# Patient Record
Sex: FEMALE | Race: WHITE | NOT HISPANIC OR LATINO | Employment: FULL TIME | URBAN - METROPOLITAN AREA
[De-identification: names, ages, dates, MRNs, and addresses within clinical notes are randomized per-mention and may not be internally consistent; named-entity substitution may affect disease eponyms.]

---

## 2021-01-27 ENCOUNTER — TELEMEDICINE (OUTPATIENT)
Dept: BEHAVIORAL HEALTH | Facility: CLINIC | Age: 66
End: 2021-01-27
Payer: COMMERCIAL

## 2021-01-27 DIAGNOSIS — F10.21 ALCOHOL DEPENDENCE IN REMISSION (HCC): ICD-10-CM

## 2021-01-27 DIAGNOSIS — F31.32 BIPOLAR AFFECTIVE DISORDER, CURRENTLY DEPRESSED, MODERATE (HCC): Primary | ICD-10-CM

## 2021-01-27 DIAGNOSIS — F41.1 GENERALIZED ANXIETY DISORDER: ICD-10-CM

## 2021-01-27 PROCEDURE — 96127 BRIEF EMOTIONAL/BEHAV ASSMT: CPT | Performed by: PSYCHIATRY & NEUROLOGY

## 2021-01-27 PROCEDURE — 99204 OFFICE O/P NEW MOD 45 MIN: CPT | Performed by: PSYCHIATRY & NEUROLOGY

## 2021-01-27 RX ORDER — TRAZODONE HYDROCHLORIDE 50 MG/1
50 TABLET ORAL NIGHTLY PRN
Qty: 60 TAB | Refills: 1 | Status: SHIPPED | OUTPATIENT
Start: 2021-01-27 | End: 2021-03-26 | Stop reason: SDUPTHER

## 2021-01-27 RX ORDER — LURASIDONE HYDROCHLORIDE 20 MG/1
20 TABLET, FILM COATED ORAL
Qty: 60 TAB | Refills: 1 | Status: SHIPPED | OUTPATIENT
Start: 2021-01-27 | End: 2021-03-26

## 2021-01-27 ASSESSMENT — ANXIETY QUESTIONNAIRES
5. BEING SO RESTLESS THAT IT IS HARD TO SIT STILL: NOT AT ALL
1. FEELING NERVOUS, ANXIOUS, OR ON EDGE: NEARLY EVERY DAY
2. NOT BEING ABLE TO STOP OR CONTROL WORRYING: NEARLY EVERY DAY
6. BECOMING EASILY ANNOYED OR IRRITABLE: SEVERAL DAYS
7. FEELING AFRAID AS IF SOMETHING AWFUL MIGHT HAPPEN: MORE THAN HALF THE DAYS
4. TROUBLE RELAXING: NEARLY EVERY DAY
GAD7 TOTAL SCORE: 15
3. WORRYING TOO MUCH ABOUT DIFFERENT THINGS: NEARLY EVERY DAY

## 2021-01-27 ASSESSMENT — PATIENT HEALTH QUESTIONNAIRE - PHQ9
CLINICAL INTERPRETATION OF PHQ2 SCORE: 4
5. POOR APPETITE OR OVEREATING: 3 - NEARLY EVERY DAY
SUM OF ALL RESPONSES TO PHQ QUESTIONS 1-9: 18
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
## 2021-03-25 ENCOUNTER — TELEPHONE (OUTPATIENT)
Dept: SCHEDULING | Facility: IMAGING CENTER | Age: 66
End: 2021-03-25

## 2021-03-26 ENCOUNTER — APPOINTMENT (OUTPATIENT)
Dept: BEHAVIORAL HEALTH | Facility: CLINIC | Age: 66
End: 2021-03-26
Payer: COMMERCIAL

## 2021-03-26 ENCOUNTER — TELEMEDICINE (OUTPATIENT)
Dept: BEHAVIORAL HEALTH | Facility: CLINIC | Age: 66
End: 2021-03-26

## 2021-03-26 DIAGNOSIS — F41.1 GENERALIZED ANXIETY DISORDER: ICD-10-CM

## 2021-03-26 DIAGNOSIS — F10.21 ALCOHOL DEPENDENCE IN REMISSION (HCC): ICD-10-CM

## 2021-03-26 DIAGNOSIS — F31.32 BIPOLAR AFFECTIVE DISORDER, CURRENTLY DEPRESSED, MODERATE (HCC): ICD-10-CM

## 2021-03-26 DIAGNOSIS — L65.9 HAIR LOSS: ICD-10-CM

## 2021-03-26 PROCEDURE — 99214 OFFICE O/P EST MOD 30 MIN: CPT | Mod: 95,CR | Performed by: PSYCHIATRY & NEUROLOGY

## 2021-03-26 PROCEDURE — 90833 PSYTX W PT W E/M 30 MIN: CPT | Mod: 95,CR | Performed by: PSYCHIATRY & NEUROLOGY

## 2021-03-26 RX ORDER — BIOTIN 5 MG
5 TABLET ORAL DAILY
Qty: 30 TABLET | Refills: 1 | Status: SHIPPED | OUTPATIENT
Start: 2021-03-26 | End: 2021-04-12

## 2021-03-26 RX ORDER — GINSENG 100 MG
50 CAPSULE ORAL DAILY
Qty: 30 TABLET | Refills: 1 | Status: SHIPPED | OUTPATIENT
Start: 2021-03-26 | End: 2021-04-12

## 2021-03-26 RX ORDER — LURASIDONE HYDROCHLORIDE 40 MG/1
40 TABLET, FILM COATED ORAL
Qty: 30 TABLET | Refills: 1 | Status: SHIPPED | OUTPATIENT
Start: 2021-03-26 | End: 2021-06-29 | Stop reason: SDUPTHER

## 2021-03-26 RX ORDER — TRAZODONE HYDROCHLORIDE 50 MG/1
50 TABLET ORAL NIGHTLY PRN
Qty: 60 TABLET | Refills: 1 | Status: SHIPPED | OUTPATIENT
Start: 2021-03-26 | End: 2022-03-01
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
## 2021-04-12 ENCOUNTER — OFFICE VISIT (OUTPATIENT)
Dept: MEDICAL GROUP | Facility: IMAGING CENTER | Age: 66
End: 2021-04-12
Payer: COMMERCIAL

## 2021-04-12 ENCOUNTER — HOSPITAL ENCOUNTER (OUTPATIENT)
Dept: LAB | Facility: MEDICAL CENTER | Age: 66
End: 2021-04-12
Attending: PHYSICIAN ASSISTANT
Payer: COMMERCIAL

## 2021-04-12 VITALS
RESPIRATION RATE: 16 BRPM | DIASTOLIC BLOOD PRESSURE: 78 MMHG | SYSTOLIC BLOOD PRESSURE: 138 MMHG | TEMPERATURE: 99.2 F | HEART RATE: 63 BPM | OXYGEN SATURATION: 99 % | WEIGHT: 228 LBS | HEIGHT: 64 IN | BODY MASS INDEX: 38.93 KG/M2

## 2021-04-12 DIAGNOSIS — F10.21 ALCOHOL DEPENDENCE IN REMISSION (HCC): ICD-10-CM

## 2021-04-12 DIAGNOSIS — R73.03 PREDIABETES: ICD-10-CM

## 2021-04-12 DIAGNOSIS — G47.33 OBSTRUCTIVE SLEEP APNEA SYNDROME: ICD-10-CM

## 2021-04-12 DIAGNOSIS — I48.91 ATRIAL FIBRILLATION, UNSPECIFIED TYPE (HCC): ICD-10-CM

## 2021-04-12 DIAGNOSIS — Z00.00 WELLNESS EXAMINATION: ICD-10-CM

## 2021-04-12 DIAGNOSIS — I10 HYPERTENSION, UNSPECIFIED TYPE: ICD-10-CM

## 2021-04-12 DIAGNOSIS — F31.32 BIPOLAR AFFECTIVE DISORDER, CURRENTLY DEPRESSED, MODERATE (HCC): ICD-10-CM

## 2021-04-12 DIAGNOSIS — L28.1 PRURIGO NODULARIS: ICD-10-CM

## 2021-04-12 DIAGNOSIS — Z11.59 NEED FOR HEPATITIS C SCREENING TEST: ICD-10-CM

## 2021-04-12 DIAGNOSIS — E04.1 NONTOXIC SINGLE THYROID NODULE: ICD-10-CM

## 2021-04-12 DIAGNOSIS — K63.5 BENIGN COLON POLYP: ICD-10-CM

## 2021-04-12 DIAGNOSIS — Z78.0 POST-MENOPAUSE: ICD-10-CM

## 2021-04-12 DIAGNOSIS — T78.40XA ALLERGY, INITIAL ENCOUNTER: ICD-10-CM

## 2021-04-12 DIAGNOSIS — Z12.31 ENCOUNTER FOR SCREENING MAMMOGRAM FOR BREAST CANCER: ICD-10-CM

## 2021-04-12 DIAGNOSIS — E78.5 HYPERLIPIDEMIA, UNSPECIFIED HYPERLIPIDEMIA TYPE: ICD-10-CM

## 2021-04-12 PROBLEM — E74.39 GLUCOSE INTOLERANCE: Status: ACTIVE | Noted: 2019-02-21

## 2021-04-12 PROBLEM — F31.9 BIPOLAR 1 DISORDER (HCC): Status: ACTIVE | Noted: 2019-02-21

## 2021-04-12 PROBLEM — K21.9 GASTROESOPHAGEAL REFLUX DISEASE WITHOUT ESOPHAGITIS: Status: ACTIVE | Noted: 2019-02-21

## 2021-04-12 PROBLEM — M79.7 FIBROMYALGIA: Status: ACTIVE | Noted: 2020-05-13

## 2021-04-12 PROBLEM — L30.9 ECZEMA: Status: ACTIVE | Noted: 2021-04-12

## 2021-04-12 PROBLEM — K44.9 HIATAL HERNIA: Status: ACTIVE | Noted: 2020-11-23

## 2021-04-12 LAB
BASOPHILS # BLD AUTO: 0.5 % (ref 0–1.8)
BASOPHILS # BLD: 0.04 K/UL (ref 0–0.12)
EOSINOPHIL # BLD AUTO: 0.2 K/UL (ref 0–0.51)
EOSINOPHIL NFR BLD: 2.7 % (ref 0–6.9)
ERYTHROCYTE [DISTWIDTH] IN BLOOD BY AUTOMATED COUNT: 41.7 FL (ref 35.9–50)
ERYTHROCYTE [SEDIMENTATION RATE] IN BLOOD BY WESTERGREN METHOD: 36 MM/HOUR (ref 0–30)
EST. AVERAGE GLUCOSE BLD GHB EST-MCNC: 128 MG/DL
FOLATE SERPL-MCNC: 6.7 NG/ML
HBA1C MFR BLD: 6.1 % (ref 4–5.6)
HCT VFR BLD AUTO: 39.3 % (ref 37–47)
HCV AB SER QL: NORMAL
HGB BLD-MCNC: 12.9 G/DL (ref 12–16)
IMM GRANULOCYTES # BLD AUTO: 0.03 K/UL (ref 0–0.11)
IMM GRANULOCYTES NFR BLD AUTO: 0.4 % (ref 0–0.9)
LYMPHOCYTES # BLD AUTO: 1.35 K/UL (ref 1–4.8)
LYMPHOCYTES NFR BLD: 18.2 % (ref 22–41)
MCH RBC QN AUTO: 29.4 PG (ref 27–33)
MCHC RBC AUTO-ENTMCNC: 32.8 G/DL (ref 33.6–35)
MCV RBC AUTO: 89.5 FL (ref 81.4–97.8)
MONOCYTES # BLD AUTO: 0.44 K/UL (ref 0–0.85)
MONOCYTES NFR BLD AUTO: 5.9 % (ref 0–13.4)
NEUTROPHILS # BLD AUTO: 5.36 K/UL (ref 2–7.15)
NEUTROPHILS NFR BLD: 72.3 % (ref 44–72)
NRBC # BLD AUTO: 0 K/UL
NRBC BLD-RTO: 0 /100 WBC
PLATELET # BLD AUTO: 272 K/UL (ref 164–446)
PMV BLD AUTO: 10 FL (ref 9–12.9)
RBC # BLD AUTO: 4.39 M/UL (ref 4.2–5.4)
T4 FREE SERPL-MCNC: 1.06 NG/DL (ref 0.93–1.7)
TSH SERPL DL<=0.005 MIU/L-ACNC: 1.32 UIU/ML (ref 0.38–5.33)
VIT B12 SERPL-MCNC: 448 PG/ML (ref 211–911)
WBC # BLD AUTO: 7.4 K/UL (ref 4.8–10.8)

## 2021-04-12 PROCEDURE — 36415 COLL VENOUS BLD VENIPUNCTURE: CPT

## 2021-04-12 PROCEDURE — 83036 HEMOGLOBIN GLYCOSYLATED A1C: CPT

## 2021-04-12 PROCEDURE — 99386 PREV VISIT NEW AGE 40-64: CPT | Performed by: PHYSICIAN ASSISTANT

## 2021-04-12 PROCEDURE — 85652 RBC SED RATE AUTOMATED: CPT

## 2021-04-12 PROCEDURE — 85025 COMPLETE CBC W/AUTO DIFF WBC: CPT

## 2021-04-12 PROCEDURE — 82746 ASSAY OF FOLIC ACID SERUM: CPT

## 2021-04-12 PROCEDURE — 84443 ASSAY THYROID STIM HORMONE: CPT

## 2021-04-12 PROCEDURE — 84439 ASSAY OF FREE THYROXINE: CPT

## 2021-04-12 PROCEDURE — G0472 HEP C SCREEN HIGH RISK/OTHER: HCPCS

## 2021-04-12 PROCEDURE — 82607 VITAMIN B-12: CPT

## 2021-04-12 RX ORDER — QUETIAPINE FUMARATE 50 MG/1
50 TABLET, FILM COATED ORAL
COMMUNITY
End: 2021-04-12

## 2021-04-12 RX ORDER — TRIAMCINOLONE ACETONIDE 5 MG/G
CREAM TOPICAL
COMMUNITY
End: 2021-06-29

## 2021-04-12 RX ORDER — DEXAMETHASONE 4 MG/1
1 TABLET ORAL
COMMUNITY
Start: 2021-01-14 | End: 2021-05-12

## 2021-04-12 RX ORDER — PANTOPRAZOLE SODIUM 20 MG/1
20 TABLET, DELAYED RELEASE ORAL DAILY
COMMUNITY
Start: 2021-01-14 | End: 2021-06-29 | Stop reason: SDUPTHER

## 2021-04-12 RX ORDER — METOPROLOL SUCCINATE 25 MG/1
25 TABLET, EXTENDED RELEASE ORAL DAILY
COMMUNITY
Start: 2021-01-14 | End: 2021-12-08

## 2021-04-12 RX ORDER — COLESEVELAM 180 1/1
625 TABLET ORAL 2 TIMES DAILY WITH MEALS
COMMUNITY
Start: 2021-03-22 | End: 2021-10-15

## 2021-04-12 ASSESSMENT — ANXIETY QUESTIONNAIRES
5. BEING SO RESTLESS THAT IT IS HARD TO SIT STILL: SEVERAL DAYS
7. FEELING AFRAID AS IF SOMETHING AWFUL MIGHT HAPPEN: NEARLY EVERY DAY
6. BECOMING EASILY ANNOYED OR IRRITABLE: SEVERAL DAYS
4. TROUBLE RELAXING: NEARLY EVERY DAY
2. NOT BEING ABLE TO STOP OR CONTROL WORRYING: NEARLY EVERY DAY
3. WORRYING TOO MUCH ABOUT DIFFERENT THINGS: NEARLY EVERY DAY
1. FEELING NERVOUS, ANXIOUS, OR ON EDGE: NEARLY EVERY DAY
GAD7 TOTAL SCORE: 17

## 2021-04-12 ASSESSMENT — PATIENT HEALTH QUESTIONNAIRE - PHQ9
CLINICAL INTERPRETATION OF PHQ2 SCORE: 5
SUM OF ALL RESPONSES TO PHQ QUESTIONS 1-9: 19
5. POOR APPETITE OR OVEREATING: 1 - SEVERAL DAYS

## 2021-04-12 ASSESSMENT — PAIN SCALES - GENERAL: PAINLEVEL: NO PAIN

## 2021-04-12 NOTE — ASSESSMENT & PLAN NOTE
Patient had colonoscopy in December 2020 with 3 polyps.  2 of the polyps were adenomas one was around 1 cm in size and the other was hyperplastic.  Was recommended to repeat in 2022.

## 2021-04-12 NOTE — PATIENT INSTRUCTIONS
Once resulted, your lab/test/imaging results will show up automatically in your MyChart. Please wait for my interpretation and recommendations prior to viewing your results to avoid any unnecessary confusion or misinterpretation. I will address all of the lab values that I interpret as abnormal and message you accordingly on your MyChart. I will always send you a message on your labs even if they are normal. If you do not hear back from me within 5 business days after getting your labs drawn, then please send me a message on MyChart so I can obtain your labs (especially if you went to an outside lab - [facility], [facility], etc). If you have any additional questions or concerns beyond my interpretation of your results, please make an appointment with me to discuss in further detail.    Please only use the [employer] messaging system for questions regarding your most recent appointment or if advised to use otherwise (glucose or blood pressure reporting). If you have any new problems or concerns, you must make an appointment to discuss. This includes any referral requests.     Thank you,    Alejandra Frederick PA-C ([place])  Physician Assistant Certified  [facility]

## 2021-04-15 ENCOUNTER — HOSPITAL ENCOUNTER (OUTPATIENT)
Dept: LAB | Facility: MEDICAL CENTER | Age: 66
End: 2021-04-15
Attending: PHYSICIAN ASSISTANT
Payer: COMMERCIAL

## 2021-04-15 DIAGNOSIS — I10 HYPERTENSION, UNSPECIFIED TYPE: ICD-10-CM

## 2021-04-15 DIAGNOSIS — E78.5 HYPERLIPIDEMIA, UNSPECIFIED HYPERLIPIDEMIA TYPE: ICD-10-CM

## 2021-04-15 LAB
ALBUMIN SERPL BCP-MCNC: 3.9 G/DL (ref 3.2–4.9)
ALBUMIN/GLOB SERPL: 1.4 G/DL
ALP SERPL-CCNC: 129 U/L (ref 30–99)
ALT SERPL-CCNC: 16 U/L (ref 2–50)
ANION GAP SERPL CALC-SCNC: 7 MMOL/L (ref 7–16)
AST SERPL-CCNC: 18 U/L (ref 12–45)
BILIRUB SERPL-MCNC: 0.4 MG/DL (ref 0.1–1.5)
BUN SERPL-MCNC: 17 MG/DL (ref 8–22)
CALCIUM SERPL-MCNC: 9.1 MG/DL (ref 8.5–10.5)
CHLORIDE SERPL-SCNC: 106 MMOL/L (ref 96–112)
CHOLEST SERPL-MCNC: 186 MG/DL (ref 100–199)
CO2 SERPL-SCNC: 26 MMOL/L (ref 20–33)
CREAT SERPL-MCNC: 0.82 MG/DL (ref 0.5–1.4)
FASTING STATUS PATIENT QL REPORTED: NORMAL
GLOBULIN SER CALC-MCNC: 2.7 G/DL (ref 1.9–3.5)
GLUCOSE SERPL-MCNC: 115 MG/DL (ref 65–99)
HDLC SERPL-MCNC: 50 MG/DL
LDLC SERPL CALC-MCNC: 99 MG/DL
POTASSIUM SERPL-SCNC: 4.5 MMOL/L (ref 3.6–5.5)
PROT SERPL-MCNC: 6.6 G/DL (ref 6–8.2)
SODIUM SERPL-SCNC: 139 MMOL/L (ref 135–145)
TRIGL SERPL-MCNC: 186 MG/DL (ref 0–149)

## 2021-04-15 PROCEDURE — 80053 COMPREHEN METABOLIC PANEL: CPT

## 2021-04-15 PROCEDURE — 80061 LIPID PANEL: CPT

## 2021-04-15 PROCEDURE — 36415 COLL VENOUS BLD VENIPUNCTURE: CPT

## 2021-04-16 PROBLEM — R74.8 ELEVATED ALKALINE PHOSPHATASE LEVEL: Status: ACTIVE | Noted: 2021-04-16

## 2021-05-06 ENCOUNTER — APPOINTMENT (OUTPATIENT)
Dept: RADIOLOGY | Facility: MEDICAL CENTER | Age: 66
End: 2021-05-06
Attending: PHYSICIAN ASSISTANT
Payer: COMMERCIAL

## 2021-05-12 ENCOUNTER — OFFICE VISIT (OUTPATIENT)
Dept: CARDIOLOGY | Facility: MEDICAL CENTER | Age: 66
End: 2021-05-12
Attending: PHYSICIAN ASSISTANT
Payer: MEDICARE

## 2021-05-12 VITALS
OXYGEN SATURATION: 96 % | WEIGHT: 228 LBS | HEART RATE: 68 BPM | DIASTOLIC BLOOD PRESSURE: 78 MMHG | BODY MASS INDEX: 38.93 KG/M2 | SYSTOLIC BLOOD PRESSURE: 124 MMHG | HEIGHT: 64 IN

## 2021-05-12 DIAGNOSIS — I70.0 ATHEROSCLEROSIS OF AORTA (HCC): ICD-10-CM

## 2021-05-12 DIAGNOSIS — I10 ESSENTIAL HYPERTENSION: ICD-10-CM

## 2021-05-12 DIAGNOSIS — I48.92 ATRIAL FLUTTER, UNSPECIFIED TYPE (HCC): ICD-10-CM

## 2021-05-12 DIAGNOSIS — I51.9 HEART DISEASE, UNSPECIFIED: ICD-10-CM

## 2021-05-12 DIAGNOSIS — Z91.89 10 YEAR RISK OF MI OR STROKE 7.5% OR GREATER: ICD-10-CM

## 2021-05-12 DIAGNOSIS — E78.2 MIXED HYPERLIPIDEMIA: ICD-10-CM

## 2021-05-12 LAB — EKG IMPRESSION: NORMAL

## 2021-05-12 PROCEDURE — 93000 ELECTROCARDIOGRAM COMPLETE: CPT | Performed by: INTERNAL MEDICINE

## 2021-05-12 PROCEDURE — 99204 OFFICE O/P NEW MOD 45 MIN: CPT | Performed by: INTERNAL MEDICINE

## 2021-05-12 ASSESSMENT — ENCOUNTER SYMPTOMS
SYNCOPE: 0
BLURRED VISION: 0
DIARRHEA: 0
COUGH: 0
PND: 0
ALTERED MENTAL STATUS: 0
ORTHOPNEA: 0
HEARTBURN: 0
PALPITATIONS: 0
FEVER: 0
WEIGHT GAIN: 0
BACK PAIN: 0
DECREASED APPETITE: 0
VOMITING: 0
SHORTNESS OF BREATH: 0
DYSPNEA ON EXERTION: 0
DIZZINESS: 0
IRREGULAR HEARTBEAT: 0
NEAR-SYNCOPE: 0
WEIGHT LOSS: 0
FLANK PAIN: 0
CLAUDICATION: 0
ABDOMINAL PAIN: 0
CONSTIPATION: 0
NAUSEA: 0
DEPRESSION: 0

## 2021-05-12 ASSESSMENT — FIBROSIS 4 INDEX: FIB4 SCORE: 1.08

## 2021-05-12 NOTE — PROGRESS NOTES
Cardiology Note    Chief Complaint   Patient presents with   • Atrial Fibrillation     S/p ablation NOV.2020   • Atrial Flutter   • Hypertension       History of Present Illness: Akua Winter is a 66 y.o. female PMH atrial flutter s/p DCCV, HLD, HTN, GERD, DAMIÁN on cpap, aortic atherosclerosis who presents for initial visit.    Walks regularly. No cardiac complaints when doing so. Although admits not as far as in the [place] area. No toxic social habits currently. She is recovered alcoholic; sober 9 years. Active member of AA and feels privileged to help others overcome alcoholism. Bother parents passed from cva and mi in early 60s.     Review of Systems   Constitutional: Negative for decreased appetite, fever, malaise/fatigue, weight gain and weight loss.   HENT: Negative for congestion and nosebleeds.    Eyes: Negative for blurred vision.   Cardiovascular: Negative for chest pain, claudication, dyspnea on exertion, irregular heartbeat, leg swelling, near-syncope, orthopnea, palpitations, paroxysmal nocturnal dyspnea and syncope.   Respiratory: Negative for cough and shortness of breath.    Endocrine: Negative for cold intolerance and heat intolerance.   Skin: Negative for rash.   Musculoskeletal: Negative for back pain.   Gastrointestinal: Negative for abdominal pain, constipation, diarrhea, heartburn, melena, nausea and vomiting.   Genitourinary: Negative for dysuria, flank pain and hematuria.   Neurological: Negative for dizziness.   Psychiatric/Behavioral: Negative for altered mental status and depression.         Past Medical History:   Diagnosis Date   • Apnea    • Atherosclerosis of aorta (HCC)    • Atrial fibrillation and flutter (HCC)    • Bipolar 1 disorder (HCC)    • GERD (gastroesophageal reflux disease)    • Hx of colonic polyp 8/5/2013    Formatting of this note might be different from the original. Had colonoscopy in 2013; 3 polyps removed in the colon [place] 2015, recommend repeat in 2020   • Pain      myofacial pain syndrome   • Prurigo nodularis    • Reactive airway disease          History reviewed. No pertinent surgical history.      Current Outpatient Medications   Medication Sig Dispense Refill   • apixaban (ELIQUIS) 5mg Tab Take 5 mg by mouth 2 times a day.     • colesevelam (WELCHOL) 625 MG Tab Take 625 mg by mouth 2 times a day with meals.     • metoprolol SR (TOPROL XL) 25 MG TABLET SR 24 HR Take 25 mg by mouth every day.     • pantoprazole (PROTONIX) 20 MG tablet Take 20 mg by mouth every day.     • triamcinolone acetonide (KENALOG) 0.5 % Cream Apply  topically.     • dupilumab (DUPIXENT) 300 MG/2ML Solution Prefilled Syringe injection Inject 2 mL under the skin every 14 days. 4 mL 3   • lurasidone (LATUDA) 40 MG Tab Take 1 tablet by mouth with dinner. 30 tablet 1   • traZODone (DESYREL) 50 MG Tab Take 1 tablet by mouth at bedtime as needed for Sleep (as needed for sleep). Take 1/2 to 1 tablet by mouth at bedtime. 60 tablet 1     No current facility-administered medications for this visit.         No Known Allergies      Family History   Problem Relation Age of Onset   • Stroke Mother    • Heart Disease Father          Social History     Socioeconomic History   • Marital status:      Spouse name: Not on file   • Number of children: Not on file   • Years of education: Not on file   • Highest education level: Not on file   Occupational History   • Not on file   Tobacco Use   • Smoking status: Former Smoker   • Smokeless tobacco: Never Used   • Tobacco comment: 40 years ago   Vaping Use   • Vaping Use: Never used   Substance and Sexual Activity   • Alcohol use: Not Currently     Comment: recovering alcoholic, sober for 8 years   • Drug use: Not Currently   • Sexual activity: Not Currently   Other Topics Concern   • Not on file   Social History Narrative   • Not on file     Social Determinants of Health     Financial Resource Strain:    • Difficulty of Paying Living Expenses:    Food Insecurity:   "  • Worried About Running Out of Food in the Last Year:    • Ran Out of Food in the Last Year:    Transportation Needs:    • Lack of Transportation (Medical):    • Lack of Transportation (Non-Medical):    Physical Activity:    • Days of Exercise per Week:    • Minutes of Exercise per Session:    Stress:    • Feeling of Stress :    Social Connections:    • Frequency of Communication with Friends and Family:    • Frequency of Social Gatherings with Friends and Family:    • Attends Confucianist Services:    • Active Member of Clubs or Organizations:    • Attends Club or Organization Meetings:    • Marital Status:    Intimate Partner Violence:    • Fear of Current or Ex-Partner:    • Emotionally Abused:    • Physically Abused:    • Sexually Abused:          Physical Exam:  Ambulatory Vitals  /78 (BP Location: Left arm, Patient Position: Sitting, BP Cuff Size: Adult)   Pulse 68   Ht 1.626 m (5' 4\")   Wt 103 kg (228 lb)   SpO2 96%    BP Readings from Last 4 Encounters:   05/12/21 124/78   04/12/21 138/78     Weight/BMI:   Vitals:    05/12/21 1429   BP: 124/78   Weight: 103 kg (228 lb)   Height: 1.626 m (5' 4\")    Body mass index is 39.14 kg/m².  Wt Readings from Last 4 Encounters:   05/12/21 103 kg (228 lb)   04/12/21 103 kg (228 lb)       Physical Exam  Constitutional:       General: She is not in acute distress.  HENT:      Head: Normocephalic and atraumatic.   Eyes:      Conjunctiva/sclera: Conjunctivae normal.      Pupils: Pupils are equal, round, and reactive to light.   Neck:      Vascular: No JVD.   Cardiovascular:      Rate and Rhythm: Normal rate and regular rhythm.      Heart sounds: Normal heart sounds. No murmur heard.   No friction rub. No gallop.    Pulmonary:      Effort: Pulmonary effort is normal. No respiratory distress.      Breath sounds: Normal breath sounds. No wheezing or rales.   Chest:      Chest wall: No tenderness.   Abdominal:      General: Bowel sounds are normal. There is no " distension.      Palpations: Abdomen is soft.   Musculoskeletal:      Cervical back: Normal range of motion and neck supple.   Skin:     General: Skin is warm and dry.   Neurological:      Mental Status: She is alert and oriented to person, place, and time.   Psychiatric:         Mood and Affect: Affect normal.         Judgment: Judgment normal.         Lab Data Review:  Lab Results   Component Value Date/Time    CHOLSTRLTOT 186 04/15/2021 07:33 AM    LDL 99 04/15/2021 07:33 AM    HDL 50 04/15/2021 07:33 AM    TRIGLYCERIDE 186 (H) 04/15/2021 07:33 AM       Lab Results   Component Value Date/Time    SODIUM 139 04/15/2021 07:33 AM    POTASSIUM 4.5 04/15/2021 07:33 AM    CHLORIDE 106 04/15/2021 07:33 AM    CO2 26 04/15/2021 07:33 AM    GLUCOSE 115 (H) 04/15/2021 07:33 AM    BUN 17 04/15/2021 07:33 AM    CREATININE 0.82 04/15/2021 07:33 AM     CrCl cannot be calculated (Patient's most recent lab result is older than the maximum 7 days allowed.).  Lab Results   Component Value Date/Time    ALKPHOSPHAT 129 (H) 04/15/2021 07:33 AM    ASTSGOT 18 04/15/2021 07:33 AM    ALTSGPT 16 04/15/2021 07:33 AM    TBILIRUBIN 0.4 04/15/2021 07:33 AM      Lab Results   Component Value Date/Time    WBC 7.4 04/12/2021 02:40 PM     Lab Results   Component Value Date/Time    HBA1C 6.1 (H) 04/12/2021 02:40 PM     No components found for: TROP      Cardiac Imaging and Procedures Review:      EKG 8/2020 outside EKG showing atrial flutter  EKG 5/12/21 reviewed by me sinus, NIVCD    TTE 10/2020  Conclusions   Summary   Normal LV size and function with preserved EF 60-65%.   Normal diastolic function.   No significant valvular disease.   Normal RA and PA pressures.     Medical Decision Making:  Problem List Items Addressed This Visit     Atherosclerosis of aorta (HCC)    Mixed hyperlipidemia    Hypertension    Relevant Orders    EKG (Completed)    Atrial flutter (HCC)    Relevant Orders    REFERRAL TO CARDIOLOGY    10 year risk of MI or stroke 7.5%  or greater    Relevant Orders    CT-CARDIAC SCORING    LIPOPROTEIN A    APOLIPOPROTEIN B    CRP HIGH SENSITIVE (CARDIAC)    Heart disease, unspecified     Relevant Orders    APOLIPOPROTEIN B    CRP HIGH SENSITIVE (CARDIAC)        Hx atrial flutter - prior documentations of fibrillation however she brought EKG from fateful ED visit showing flutter. Refer to EP for ablation. Consider dc doac thereafter.     HLD / aortic athero / interm 10 year risk ascvd - unable to pull up aortic pictures to discern significance/degree athero. Check CAC CT to eval statin candidacy    HTN - controlled. Goal 120/80.     It was my pleasure to meet with Ms. Winter.

## 2021-05-31 ENCOUNTER — HOSPITAL ENCOUNTER (OUTPATIENT)
Facility: MEDICAL CENTER | Age: 66
End: 2021-05-31
Attending: PHYSICIAN ASSISTANT
Payer: MEDICARE

## 2021-05-31 PROCEDURE — U0005 INFEC AGEN DETEC AMPLI PROBE: HCPCS

## 2021-05-31 PROCEDURE — U0003 INFECTIOUS AGENT DETECTION BY NUCLEIC ACID (DNA OR RNA); SEVERE ACUTE RESPIRATORY SYNDROME CORONAVIRUS 2 (SARS-COV-2) (CORONAVIRUS DISEASE [COVID-19]), AMPLIFIED PROBE TECHNIQUE, MAKING USE OF HIGH THROUGHPUT TECHNOLOGIES AS DESCRIBED BY CMS-2020-01-R: HCPCS

## 2021-06-01 LAB
COVID ORDER STATUS COVID19: NORMAL
SARS-COV-2 RNA RESP QL NAA+PROBE: NOTDETECTED
SPECIMEN SOURCE: NORMAL

## 2021-06-29 ENCOUNTER — OFFICE VISIT (OUTPATIENT)
Dept: MEDICAL GROUP | Facility: IMAGING CENTER | Age: 66
End: 2021-06-29
Payer: MEDICARE

## 2021-06-29 VITALS
BODY MASS INDEX: 38.07 KG/M2 | DIASTOLIC BLOOD PRESSURE: 68 MMHG | HEART RATE: 64 BPM | HEIGHT: 64 IN | SYSTOLIC BLOOD PRESSURE: 124 MMHG | TEMPERATURE: 98.5 F | WEIGHT: 223 LBS | OXYGEN SATURATION: 98 % | RESPIRATION RATE: 18 BRPM

## 2021-06-29 DIAGNOSIS — M79.89 LEG SWELLING: ICD-10-CM

## 2021-06-29 DIAGNOSIS — E78.5 HYPERLIPIDEMIA, UNSPECIFIED HYPERLIPIDEMIA TYPE: ICD-10-CM

## 2021-06-29 DIAGNOSIS — R73.03 PREDIABETES: ICD-10-CM

## 2021-06-29 DIAGNOSIS — R10.2 PELVIC PAIN: ICD-10-CM

## 2021-06-29 DIAGNOSIS — R74.8 ELEVATED ALKALINE PHOSPHATASE LEVEL: ICD-10-CM

## 2021-06-29 DIAGNOSIS — F31.32 BIPOLAR AFFECTIVE DISORDER, CURRENTLY DEPRESSED, MODERATE (HCC): ICD-10-CM

## 2021-06-29 DIAGNOSIS — F41.1 GENERALIZED ANXIETY DISORDER: ICD-10-CM

## 2021-06-29 DIAGNOSIS — I10 HYPERTENSION, UNSPECIFIED TYPE: ICD-10-CM

## 2021-06-29 DIAGNOSIS — R10.13 EPIGASTRIC PAIN: ICD-10-CM

## 2021-06-29 DIAGNOSIS — R14.0 ABDOMINAL BLOATING: ICD-10-CM

## 2021-06-29 DIAGNOSIS — K21.9 GASTROESOPHAGEAL REFLUX DISEASE WITHOUT ESOPHAGITIS: ICD-10-CM

## 2021-06-29 DIAGNOSIS — E66.9 OBESITY (BMI 30-39.9): ICD-10-CM

## 2021-06-29 DIAGNOSIS — L28.1 PRURIGO NODULARIS: ICD-10-CM

## 2021-06-29 DIAGNOSIS — Z91.89 10 YEAR RISK OF MI OR STROKE 7.5% OR GREATER: ICD-10-CM

## 2021-06-29 DIAGNOSIS — R11.0 NAUSEA: ICD-10-CM

## 2021-06-29 DIAGNOSIS — R19.7 DIARRHEA, UNSPECIFIED TYPE: ICD-10-CM

## 2021-06-29 PROBLEM — F31.70 BIPOLAR AFFECTIVE DISORDER IN REMISSION (HCC): Status: ACTIVE | Noted: 2021-01-27

## 2021-06-29 PROCEDURE — 99214 OFFICE O/P EST MOD 30 MIN: CPT | Performed by: PHYSICIAN ASSISTANT

## 2021-06-29 RX ORDER — ATORVASTATIN CALCIUM 20 MG/1
20 TABLET, FILM COATED ORAL DAILY
Qty: 90 TABLET | Refills: 1 | Status: SHIPPED | OUTPATIENT
Start: 2021-06-29 | End: 2021-11-01

## 2021-06-29 RX ORDER — LURASIDONE HYDROCHLORIDE 40 MG/1
40 TABLET, FILM COATED ORAL
Qty: 90 TABLET | Refills: 0 | Status: SHIPPED | OUTPATIENT
Start: 2021-06-29 | End: 2021-10-05

## 2021-06-29 RX ORDER — PANTOPRAZOLE SODIUM 20 MG/1
20 TABLET, DELAYED RELEASE ORAL 2 TIMES DAILY
Qty: 180 TABLET | Refills: 1 | Status: SHIPPED | OUTPATIENT
Start: 2021-06-29 | End: 2021-09-27

## 2021-06-29 ASSESSMENT — FIBROSIS 4 INDEX: FIB4 SCORE: 1.08

## 2021-06-29 ASSESSMENT — PAIN SCALES - GENERAL: PAINLEVEL: NO PAIN

## 2021-06-29 NOTE — ASSESSMENT & PLAN NOTE
Admits to leg swelling in both legs L>R over the past few months. She gets occasional pain into her calf. Takes eliquis daily. Has been increasing her walking to 2 hours a day over the past few months.

## 2021-06-29 NOTE — PROGRESS NOTES
Subjective:     CC:   Chief Complaint   Patient presents with   • Follow-Up   • Lab Results   • GI Problem   • Leg Swelling     leg ankle swelling and pain intermittant cant sleep/left leg       HPI:   Akua presents today for:    Abdominal bloating  Pt admits to intermittent abdominal bloating with epigastric/LUQ pain, morning nausea, vomiting once a week, and diarrhea every few weeks. She takes Protonix 20 mg once a day. Has breakthrough heart burn. Has occasional pelvic fullness/discomfort. No vaginal bleeding. No melena, hematochezia, hematemesis. Last endoscopy was in 2020 and was told to repeat in 2 years due to colon polyps.     Bipolar affective disorder in remission (HCC)  Controlled on Latuda - requesting refill today.    Hypertension  Chronic, controlled on metoprolol.    Prurigo nodularis  Controlled on dupixent. Requesting refill.    Leg swelling  Admits to leg swelling in both legs L>R over the past few months. She gets occasional pain into her calf. Takes eliquis daily. Has been increasing her walking to 2 hours a day over the past few months.      Past Medical History:   Diagnosis Date   • Apnea    • Atherosclerosis of aorta (HCC)    • Atrial fibrillation and flutter (HCC)    • Bipolar 1 disorder (HCC)    • GERD (gastroesophageal reflux disease)    • Hx of colonic polyp 8/5/2013    Formatting of this note might be different from the original. Had colonoscopy in 2013; 3 polyps removed in the colon [place] 2015, recommend repeat in 2020   • Pain     myofacial pain syndrome   • Prurigo nodularis    • Reactive airway disease      Social History     Tobacco Use   • Smoking status: Former Smoker   • Smokeless tobacco: Never Used   • Tobacco comment: 40 years ago   Vaping Use   • Vaping Use: Never used   Substance Use Topics   • Alcohol use: Not Currently     Comment: recovering alcoholic, sober for 8 years   • Drug use: Not Currently     Current Outpatient Medications Ordered in Epic   Medication Sig  "Dispense Refill   • pantoprazole (PROTONIX) 20 MG tablet Take 1 tablet by mouth 2 times a day for 90 days. 180 tablet 1   • dupilumab (DUPIXENT) 300 MG/2ML Solution Prefilled Syringe injection Inject 2 mL under the skin every 14 days. 4 mL 3   • lurasidone (LATUDA) 40 MG Tab Take 1 tablet by mouth with dinner. 90 tablet 0   • atorvastatin (LIPITOR) 20 MG Tab Take 1 tablet by mouth every day. 90 tablet 1   • apixaban (ELIQUIS) 5mg Tab Take 5 mg by mouth 2 times a day.     • colesevelam (WELCHOL) 625 MG Tab Take 625 mg by mouth 2 times a day with meals.     • metoprolol SR (TOPROL XL) 25 MG TABLET SR 24 HR Take 25 mg by mouth every day.     • traZODone (DESYREL) 50 MG Tab Take 1 tablet by mouth at bedtime as needed for Sleep (as needed for sleep). Take 1/2 to 1 tablet by mouth at bedtime. 60 tablet 1     No current Epic-ordered facility-administered medications on file.     Patient has no known allergies.    Health Maintenance: Completed    ROS:  Gen: no fevers/chills, no changes in weight  Eyes: no changes in vision  Pulm: no sob, no cough  CV: no chest pain, no palpitations, no edema  GI: + nausea/vomiting, + diarrhea  Skin: no rash, no lesions  Neuro: no headaches, no numbness/tingling  Heme/Lymph: no easy bruising or bleeding    Objective:   Exam:  /68 (BP Location: Left arm, Patient Position: Sitting, BP Cuff Size: Adult)   Pulse 64   Temp 36.9 °C (98.5 °F) (Temporal)   Resp 18   Ht 1.626 m (5' 4\")   Wt 101 kg (223 lb)   LMP  (LMP Unknown)   SpO2 98%   BMI 38.28 kg/m²    Body mass index is 38.28 kg/m².    Gen: Alert and oriented, No apparent distress.  HEENT: Head atraumatic, normocephalic. Pupils equal and round.  Neck: Neck is supple without lymphadenopathy.   Lungs: Normal effort, CTA bilaterally, no wheezes, rhonchi, or rales  CV: Regular rate and rhythm. No murmurs, rubs, or gallops.  ABD: +BS. Mildly tender epigastrum/LUQ, mildly distended. No rebound, rigidity, or guarding.  Ext: No clubbing, " cyanosis. Trace nonpitting edema B/L. Negative brian's sign B/L.    Assessment & Plan:     66 y.o. female with the following -     1. Abdominal bloating  Patient with chronic abdominal bloating and intermittent pain in the epigastrium/left upper quadrant.  She does have daily nausea and uncontrolled heartburn.  Will increase her Protonix to 20 mg twice a day.  Order abdominal ultrasound and labs.  Follow-up with gastroenterology if no change.  - US-ABDOMEN COMPLETE SURVEY; Future  - Comp Metabolic Panel; Future  - GAMMA GT (GGT); Future  - TSH WITH REFLEX TO FT4; Future  - REFERRAL TO GASTROENTEROLOGY  - AMYLASE; Future  - LIPASE; Future    2. Epigastric pain  See above  - US-ABDOMEN COMPLETE SURVEY; Future  - pantoprazole (PROTONIX) 20 MG tablet; Take 1 tablet by mouth 2 times a day for 90 days.  Dispense: 180 tablet; Refill: 1  - CBC WITH DIFFERENTIAL; Future  - Comp Metabolic Panel; Future  - GAMMA GT (GGT); Future  - REFERRAL TO GASTROENTEROLOGY  - AMYLASE; Future  - LIPASE; Future    3. Nausea  See above wait at least 30 minutes before laying down after eating.  - US-ABDOMEN COMPLETE SURVEY; Future  - pantoprazole (PROTONIX) 20 MG tablet; Take 1 tablet by mouth 2 times a day for 90 days.  Dispense: 180 tablet; Refill: 1  - Comp Metabolic Panel; Future  - GAMMA GT (GGT); Future  - TSH WITH REFLEX TO FT4; Future  - REFERRAL TO GASTROENTEROLOGY  - AMYLASE; Future  - LIPASE; Future    4. Diarrhea, unspecified type  - US-ABDOMEN COMPLETE SURVEY; Future  - Comp Metabolic Panel; Future  - GAMMA GT (GGT); Future  - TSH WITH REFLEX TO FT4; Future  - REFERRAL TO GASTROENTEROLOGY    5. Pelvic pain  With associated lower extremity edema.   - US-PELVIC COMPLETE (TRANSABDOMINAL/TRANSVAGINAL) (COMBO); Future    6. Prediabetes  - HEMOGLOBIN A1C; Future  - Patient identified as having weight management issue.  Appropriate orders and counseling given.  - [facility] Future    7.  Hypertension, unspecified type  Chronic, controlled  - CBC WITH DIFFERENTIAL; Future    8. Hyperlipidemia, unspecified hyperlipidemia type  Chronic, add statin medication  - Lipid Profile; Future  - atorvastatin (LIPITOR) 20 MG Tab; Take 1 tablet by mouth every day.  Dispense: 90 tablet; Refill: 1  - Patient identified as having weight management issue.  Appropriate orders and counseling given.  - REFERRAL TO [facility] (HIP); Future    9. Prurigo nodularis  Refill Dupixent  - Sed Rate; Future  - dupilumab (DUPIXENT) 300 MG/2ML Solution Prefilled Syringe injection; Inject 2 mL under the skin every 14 days.  Dispense: 4 mL; Refill: 3    10. Elevated alkaline phosphatase level  Noted in previous labs, check GGT.  - GAMMA GT (GGT); Future    11. Bipolar affective disorder, currently depressed, moderate (HCC)  Chronic, controlled  - lurasidone (LATUDA) 40 MG Tab; Take 1 tablet by mouth with dinner.  Dispense: 90 tablet; Refill: 0    12. Generalized anxiety disorder  - lurasidone (LATUDA) 40 MG Tab; Take 1 tablet by mouth with dinner.  Dispense: 90 tablet; Refill: 0    13. Obesity (BMI 30-39.9)  - Patient identified as having weight management issue.  Appropriate orders and counseling given.  - REFERRAL TO [facility] (HIP); Future    14. Leg swelling  Rule out pelvic origin versus metabolic origin.  Low suspicion for vascular etiology such as DVT as patient is on Eliquis.  Recommend elevating legs when able compression stockings.  Low-sodium diet.  - US-PELVIC COMPLETE (TRANSABDOMINAL/TRANSVAGINAL) (COMBO); Future  - CBC WITH DIFFERENTIAL; Future  - Comp Metabolic Panel; Future  - TSH WITH REFLEX TO FT4; Future    15. 10 year risk of MI or stroke 7.5% or greater  Add Lipitor 20 mg daily    16. Gastroesophageal reflux disease without esophagitis  Increase Protonix to 20 mg twice a day    Return in about 4 months (around 10/29/2021).    Alejandra Frederick PA-C ([place])  Physician  Assistant Certified  [facility]    Please note that this dictation was created using voice recognition software. I have made every reasonable attempt to correct obvious errors, but I expect that there are errors of grammar and possibly content that I did not discover before finalizing the note.

## 2021-06-29 NOTE — ASSESSMENT & PLAN NOTE
Pt admits to intermittent abdominal bloating with epigastric/LUQ pain, morning nausea, vomiting once a week, and diarrhea every few weeks. She takes Protonix 20 mg once a day. Has breakthrough heart burn. Has occasional pelvic fullness/discomfort. No vaginal bleeding. No melena, hematochezia, hematemesis. Last endoscopy was in 2020 and was told to repeat in 2 years due to colon polyps.

## 2021-09-23 ENCOUNTER — HOSPITAL ENCOUNTER (OUTPATIENT)
Dept: LAB | Facility: MEDICAL CENTER | Age: 66
End: 2021-09-23
Attending: INTERNAL MEDICINE
Payer: MEDICARE

## 2021-09-23 ENCOUNTER — HOSPITAL ENCOUNTER (OUTPATIENT)
Dept: RADIOLOGY | Facility: MEDICAL CENTER | Age: 66
End: 2021-09-23
Attending: INTERNAL MEDICINE
Payer: MEDICARE

## 2021-09-23 ENCOUNTER — HOSPITAL ENCOUNTER (OUTPATIENT)
Dept: LAB | Facility: MEDICAL CENTER | Age: 66
End: 2021-09-23
Attending: PHYSICIAN ASSISTANT
Payer: MEDICARE

## 2021-09-23 DIAGNOSIS — Z91.89 10 YEAR RISK OF MI OR STROKE 7.5% OR GREATER: ICD-10-CM

## 2021-09-23 DIAGNOSIS — M79.89 LEG SWELLING: ICD-10-CM

## 2021-09-23 DIAGNOSIS — R11.0 NAUSEA: ICD-10-CM

## 2021-09-23 DIAGNOSIS — R10.32 ABDOMINAL PAIN, LEFT LOWER QUADRANT: ICD-10-CM

## 2021-09-23 DIAGNOSIS — R14.0 ABDOMINAL BLOATING: ICD-10-CM

## 2021-09-23 DIAGNOSIS — I51.9 HEART DISEASE, UNSPECIFIED: ICD-10-CM

## 2021-09-23 DIAGNOSIS — L28.1 PRURIGO NODULARIS: ICD-10-CM

## 2021-09-23 DIAGNOSIS — R10.13 EPIGASTRIC PAIN: ICD-10-CM

## 2021-09-23 DIAGNOSIS — R74.8 ELEVATED ALKALINE PHOSPHATASE LEVEL: ICD-10-CM

## 2021-09-23 DIAGNOSIS — R73.03 PREDIABETES: ICD-10-CM

## 2021-09-23 DIAGNOSIS — I10 HYPERTENSION, UNSPECIFIED TYPE: ICD-10-CM

## 2021-09-23 DIAGNOSIS — R19.7 DIARRHEA, UNSPECIFIED TYPE: ICD-10-CM

## 2021-09-23 PROCEDURE — 74018 RADEX ABDOMEN 1 VIEW: CPT

## 2021-09-24 ENCOUNTER — HOSPITAL ENCOUNTER (OUTPATIENT)
Dept: LAB | Facility: MEDICAL CENTER | Age: 66
End: 2021-09-24
Attending: INTERNAL MEDICINE
Payer: MEDICARE

## 2021-09-24 ENCOUNTER — HOSPITAL ENCOUNTER (OUTPATIENT)
Dept: LAB | Facility: MEDICAL CENTER | Age: 66
End: 2021-09-24
Attending: PHYSICIAN ASSISTANT
Payer: MEDICARE

## 2021-09-24 DIAGNOSIS — E78.5 HYPERLIPIDEMIA, UNSPECIFIED HYPERLIPIDEMIA TYPE: ICD-10-CM

## 2021-09-24 DIAGNOSIS — M79.89 LEG SWELLING: ICD-10-CM

## 2021-09-24 DIAGNOSIS — R11.0 NAUSEA: ICD-10-CM

## 2021-09-24 DIAGNOSIS — R19.7 DIARRHEA, UNSPECIFIED TYPE: ICD-10-CM

## 2021-09-24 DIAGNOSIS — R14.0 ABDOMINAL BLOATING: ICD-10-CM

## 2021-09-24 DIAGNOSIS — R10.13 EPIGASTRIC PAIN: ICD-10-CM

## 2021-09-24 LAB
ALBUMIN SERPL BCP-MCNC: 4.3 G/DL (ref 3.2–4.9)
ALBUMIN SERPL BCP-MCNC: 4.3 G/DL (ref 3.2–4.9)
ALBUMIN/GLOB SERPL: 1.6 G/DL
ALBUMIN/GLOB SERPL: 1.7 G/DL
ALP SERPL-CCNC: 155 U/L (ref 30–99)
ALP SERPL-CCNC: 155 U/L (ref 30–99)
ALT SERPL-CCNC: 24 U/L (ref 2–50)
ALT SERPL-CCNC: 24 U/L (ref 2–50)
AMYLASE SERPL-CCNC: 56 U/L (ref 20–103)
ANION GAP SERPL CALC-SCNC: 12 MMOL/L (ref 7–16)
ANION GAP SERPL CALC-SCNC: 9 MMOL/L (ref 7–16)
AST SERPL-CCNC: 17 U/L (ref 12–45)
AST SERPL-CCNC: 19 U/L (ref 12–45)
BASOPHILS # BLD AUTO: 0.6 % (ref 0–1.8)
BASOPHILS # BLD: 0.04 K/UL (ref 0–0.12)
BILIRUB SERPL-MCNC: 0.5 MG/DL (ref 0.1–1.5)
BILIRUB SERPL-MCNC: 0.5 MG/DL (ref 0.1–1.5)
BUN SERPL-MCNC: 12 MG/DL (ref 8–22)
BUN SERPL-MCNC: 12 MG/DL (ref 8–22)
CALCIUM SERPL-MCNC: 9.5 MG/DL (ref 8.5–10.5)
CALCIUM SERPL-MCNC: 9.7 MG/DL (ref 8.5–10.5)
CHLORIDE SERPL-SCNC: 102 MMOL/L (ref 96–112)
CHLORIDE SERPL-SCNC: 103 MMOL/L (ref 96–112)
CHOLEST SERPL-MCNC: 163 MG/DL (ref 100–199)
CO2 SERPL-SCNC: 26 MMOL/L (ref 20–33)
CO2 SERPL-SCNC: 28 MMOL/L (ref 20–33)
CREAT SERPL-MCNC: 0.87 MG/DL (ref 0.5–1.4)
CREAT SERPL-MCNC: 0.96 MG/DL (ref 0.5–1.4)
CRP SERPL HS-MCNC: 11.6 MG/L (ref 0–3)
EOSINOPHIL # BLD AUTO: 0.15 K/UL (ref 0–0.51)
EOSINOPHIL NFR BLD: 2.4 % (ref 0–6.9)
ERYTHROCYTE [DISTWIDTH] IN BLOOD BY AUTOMATED COUNT: 40.6 FL (ref 35.9–50)
ERYTHROCYTE [SEDIMENTATION RATE] IN BLOOD BY WESTERGREN METHOD: 26 MM/HOUR (ref 0–25)
EST. AVERAGE GLUCOSE BLD GHB EST-MCNC: 137 MG/DL
FASTING STATUS PATIENT QL REPORTED: NORMAL
GGT SERPL-CCNC: 59 U/L (ref 7–34)
GLOBULIN SER CALC-MCNC: 2.6 G/DL (ref 1.9–3.5)
GLOBULIN SER CALC-MCNC: 2.7 G/DL (ref 1.9–3.5)
GLUCOSE SERPL-MCNC: 114 MG/DL (ref 65–99)
GLUCOSE SERPL-MCNC: 119 MG/DL (ref 65–99)
HBA1C MFR BLD: 6.4 % (ref 4–5.6)
HCT VFR BLD AUTO: 40.1 % (ref 37–47)
HDLC SERPL-MCNC: 56 MG/DL
HGB BLD-MCNC: 13.3 G/DL (ref 12–16)
IMM GRANULOCYTES # BLD AUTO: 0.03 K/UL (ref 0–0.11)
IMM GRANULOCYTES NFR BLD AUTO: 0.5 % (ref 0–0.9)
LDLC SERPL CALC-MCNC: 73 MG/DL
LIPASE SERPL-CCNC: 16 U/L (ref 11–82)
LYMPHOCYTES # BLD AUTO: 1.24 K/UL (ref 1–4.8)
LYMPHOCYTES NFR BLD: 19.7 % (ref 22–41)
MCH RBC QN AUTO: 29.1 PG (ref 27–33)
MCHC RBC AUTO-ENTMCNC: 33.2 G/DL (ref 33.6–35)
MCV RBC AUTO: 87.7 FL (ref 81.4–97.8)
MONOCYTES # BLD AUTO: 0.33 K/UL (ref 0–0.85)
MONOCYTES NFR BLD AUTO: 5.2 % (ref 0–13.4)
NEUTROPHILS # BLD AUTO: 4.51 K/UL (ref 2–7.15)
NEUTROPHILS NFR BLD: 71.6 % (ref 44–72)
NRBC # BLD AUTO: 0 K/UL
NRBC BLD-RTO: 0 /100 WBC
PLATELET # BLD AUTO: 236 K/UL (ref 164–446)
PMV BLD AUTO: 9.5 FL (ref 9–12.9)
POTASSIUM SERPL-SCNC: 4.2 MMOL/L (ref 3.6–5.5)
POTASSIUM SERPL-SCNC: 4.3 MMOL/L (ref 3.6–5.5)
PROT SERPL-MCNC: 6.9 G/DL (ref 6–8.2)
PROT SERPL-MCNC: 7 G/DL (ref 6–8.2)
RBC # BLD AUTO: 4.57 M/UL (ref 4.2–5.4)
SODIUM SERPL-SCNC: 139 MMOL/L (ref 135–145)
SODIUM SERPL-SCNC: 141 MMOL/L (ref 135–145)
TRIGL SERPL-MCNC: 172 MG/DL (ref 0–149)
TSH SERPL DL<=0.005 MIU/L-ACNC: 2.1 UIU/ML (ref 0.38–5.33)
WBC # BLD AUTO: 6.3 K/UL (ref 4.8–10.8)

## 2021-09-24 PROCEDURE — 82977 ASSAY OF GGT: CPT

## 2021-09-24 PROCEDURE — 80053 COMPREHEN METABOLIC PANEL: CPT | Mod: 91

## 2021-09-24 PROCEDURE — 83036 HEMOGLOBIN GLYCOSYLATED A1C: CPT | Mod: GA

## 2021-09-24 PROCEDURE — 80053 COMPREHEN METABOLIC PANEL: CPT

## 2021-09-24 PROCEDURE — 84443 ASSAY THYROID STIM HORMONE: CPT

## 2021-09-24 PROCEDURE — 82784 ASSAY IGA/IGD/IGG/IGM EACH: CPT

## 2021-09-24 PROCEDURE — 83690 ASSAY OF LIPASE: CPT

## 2021-09-24 PROCEDURE — 85025 COMPLETE CBC W/AUTO DIFF WBC: CPT

## 2021-09-24 PROCEDURE — 83520 IMMUNOASSAY QUANT NOS NONAB: CPT

## 2021-09-24 PROCEDURE — 36415 COLL VENOUS BLD VENIPUNCTURE: CPT

## 2021-09-24 PROCEDURE — 83695 ASSAY OF LIPOPROTEIN(A): CPT

## 2021-09-24 PROCEDURE — 82172 ASSAY OF APOLIPOPROTEIN: CPT

## 2021-09-24 PROCEDURE — 83516 IMMUNOASSAY NONANTIBODY: CPT

## 2021-09-24 PROCEDURE — 85652 RBC SED RATE AUTOMATED: CPT

## 2021-09-24 PROCEDURE — 80061 LIPID PANEL: CPT

## 2021-09-24 PROCEDURE — 86141 C-REACTIVE PROTEIN HS: CPT

## 2021-09-24 PROCEDURE — 82150 ASSAY OF AMYLASE: CPT

## 2021-09-26 LAB
APO B100 SERPL-MCNC: 73 MG/DL (ref 55–125)
IGA SERPL-MCNC: 93 MG/DL (ref 68–408)
LPA SERPL-MCNC: 28 MG/DL

## 2021-09-27 LAB — GLIADIN PEPTIDE+TTG IGA+IGG SER QL IA: 5 UNITS (ref 0–19)

## 2021-09-28 LAB — ELASTASE PANC STL-MCNT: >800 UG/G

## 2021-09-29 ENCOUNTER — OFFICE VISIT (OUTPATIENT)
Dept: MEDICAL GROUP | Facility: IMAGING CENTER | Age: 66
End: 2021-09-29
Payer: MEDICARE

## 2021-09-29 VITALS
RESPIRATION RATE: 16 BRPM | DIASTOLIC BLOOD PRESSURE: 70 MMHG | BODY MASS INDEX: 38.76 KG/M2 | SYSTOLIC BLOOD PRESSURE: 128 MMHG | WEIGHT: 227 LBS | OXYGEN SATURATION: 99 % | HEART RATE: 60 BPM | TEMPERATURE: 97.9 F | HEIGHT: 64 IN

## 2021-09-29 DIAGNOSIS — R73.02 IGT (IMPAIRED GLUCOSE TOLERANCE): ICD-10-CM

## 2021-09-29 DIAGNOSIS — F31.32 BIPOLAR AFFECTIVE DISORDER, CURRENTLY DEPRESSED, MODERATE (HCC): ICD-10-CM

## 2021-09-29 DIAGNOSIS — R70.0 ELEVATED SED RATE: ICD-10-CM

## 2021-09-29 DIAGNOSIS — Z23 NEED FOR VACCINATION: ICD-10-CM

## 2021-09-29 DIAGNOSIS — R73.9 HYPERGLYCEMIA: ICD-10-CM

## 2021-09-29 DIAGNOSIS — R74.8 ELEVATED SERUM GGT LEVEL: ICD-10-CM

## 2021-09-29 DIAGNOSIS — R10.2 PELVIC PAIN: ICD-10-CM

## 2021-09-29 DIAGNOSIS — F10.21 ALCOHOL DEPENDENCE IN REMISSION (HCC): ICD-10-CM

## 2021-09-29 DIAGNOSIS — R79.82 ELEVATED C-REACTIVE PROTEIN (CRP): ICD-10-CM

## 2021-09-29 DIAGNOSIS — R19.7 DIARRHEA, UNSPECIFIED TYPE: ICD-10-CM

## 2021-09-29 DIAGNOSIS — R11.0 NAUSEA: ICD-10-CM

## 2021-09-29 DIAGNOSIS — R10.12 LEFT UPPER QUADRANT ABDOMINAL PAIN: ICD-10-CM

## 2021-09-29 DIAGNOSIS — L28.1 PRURIGO NODULARIS: ICD-10-CM

## 2021-09-29 DIAGNOSIS — R14.0 ABDOMINAL BLOATING: ICD-10-CM

## 2021-09-29 LAB
APPEARANCE UR: CLEAR
BILIRUB UR STRIP-MCNC: NORMAL MG/DL
COLOR UR AUTO: YELLOW
GLUCOSE UR STRIP.AUTO-MCNC: NORMAL MG/DL
KETONES UR STRIP.AUTO-MCNC: NORMAL MG/DL
LEUKOCYTE ESTERASE UR QL STRIP.AUTO: NORMAL
NITRITE UR QL STRIP.AUTO: NORMAL
PH UR STRIP.AUTO: 5 [PH] (ref 5–8)
PROT UR QL STRIP: NORMAL MG/DL
RBC UR QL AUTO: NORMAL
SP GR UR STRIP.AUTO: 1.02
UROBILINOGEN UR STRIP-MCNC: 0.2 MG/DL

## 2021-09-29 PROCEDURE — G0008 ADMIN INFLUENZA VIRUS VAC: HCPCS | Performed by: PHYSICIAN ASSISTANT

## 2021-09-29 PROCEDURE — 90662 IIV NO PRSV INCREASED AG IM: CPT | Performed by: PHYSICIAN ASSISTANT

## 2021-09-29 PROCEDURE — 81002 URINALYSIS NONAUTO W/O SCOPE: CPT | Performed by: PHYSICIAN ASSISTANT

## 2021-09-29 PROCEDURE — 99214 OFFICE O/P EST MOD 30 MIN: CPT | Mod: 25 | Performed by: PHYSICIAN ASSISTANT

## 2021-09-29 RX ORDER — PANTOPRAZOLE SODIUM 20 MG/1
20 TABLET, DELAYED RELEASE ORAL DAILY
COMMUNITY
End: 2021-12-27

## 2021-09-29 RX ORDER — EMPAGLIFLOZIN 10 MG/1
10 TABLET, FILM COATED ORAL DAILY
Qty: 90 TABLET | Refills: 1 | Status: SHIPPED | OUTPATIENT
Start: 2021-09-29 | End: 2022-03-01 | Stop reason: SDUPTHER

## 2021-09-29 RX ORDER — ESCITALOPRAM OXALATE 5 MG/1
5 TABLET ORAL DAILY
Qty: 30 TABLET | Refills: 0 | Status: SHIPPED | OUTPATIENT
Start: 2021-09-29 | End: 2021-10-15

## 2021-09-29 ASSESSMENT — ANXIETY QUESTIONNAIRES
5. BEING SO RESTLESS THAT IT IS HARD TO SIT STILL: SEVERAL DAYS
1. FEELING NERVOUS, ANXIOUS, OR ON EDGE: NEARLY EVERY DAY
GAD7 TOTAL SCORE: 17
3. WORRYING TOO MUCH ABOUT DIFFERENT THINGS: NEARLY EVERY DAY
6. BECOMING EASILY ANNOYED OR IRRITABLE: MORE THAN HALF THE DAYS
4. TROUBLE RELAXING: NEARLY EVERY DAY
7. FEELING AFRAID AS IF SOMETHING AWFUL MIGHT HAPPEN: MORE THAN HALF THE DAYS
2. NOT BEING ABLE TO STOP OR CONTROL WORRYING: NEARLY EVERY DAY

## 2021-09-29 ASSESSMENT — PATIENT HEALTH QUESTIONNAIRE - PHQ9
CLINICAL INTERPRETATION OF PHQ2 SCORE: 5
SUM OF ALL RESPONSES TO PHQ QUESTIONS 1-9: 18
5. POOR APPETITE OR OVEREATING: 3 - NEARLY EVERY DAY

## 2021-09-29 ASSESSMENT — FIBROSIS 4 INDEX: FIB4 SCORE: 1.07

## 2021-09-29 ASSESSMENT — PAIN SCALES - GENERAL: PAINLEVEL: 6=MODERATE PAIN

## 2021-09-29 NOTE — PROGRESS NOTES
Subjective:     CC:   Chief Complaint   Patient presents with   • GI Problem   • Depression   • Lab Results       HPI:   Akua presents today to discuss:    Left upper quadrant abdominal pain  Patient with persistent left upper quadrant pain with associated nausea, diarrhea, bloating.  No change with increase of Protonix to twice a day.  She did see gastroenterology and had a normal abdominal x-ray and normal pancreatic elastase.  Celiac testing was negative.  Previous labs showed elevated GGT and alkaline phosphatase as well as elevated sed rate and CRP.    IGT (impaired glucose tolerance)  Most recent labs show A1c of 6.4.  Patient states that she makes her own meals.  Eats several servings of fruits and vegetables a day.  Walks around 5 miles a day.    Bipolar affective disorder, currently depressed, moderate (HCC)  Patient states that most recently she has been in more of a depressive phase.  She is currently on Latuda 40 mg daily.  She states that her mood tends to become more depressed when the seasons change.  She is interested in seeing a therapist again.    Prurigo nodularis  Chronic, controlled on Dupixent.  Patient requesting refill today.    Depression Screening    Little interest or pleasure in doing things?  3 - nearly every day   Feeling down, depressed , or hopeless? 2 - more than half the days   Trouble falling or staying asleep, or sleeping too much?  1 - several days   Feeling tired or having little energy?  2 - more than half the days   Poor appetite or overeating?  3 - nearly every day   Feeling bad about yourself - or that you are a failure or have let yourself or your family down? 2 - more than half the days   Trouble concentrating on things, such as reading the newspaper or watching television? 3 - nearly every day   Moving or speaking so slowly that other people could have noticed.  Or the opposite - being so fidgety or restless that you have been moving around a lot more than usual?  2 -  more than half the days   Thoughts that you would be better off dead, or of hurting yourself?  0 - not at all   Patient Health Questionnaire Score: 18     NURY-7 Questionnaire    Feeling nervous, anxious, or on edge: Nearly every day  Not being able to sop or control worrying: Nearly every day  Worrying too much about different things: Nearly every day  Trouble relaxing: Nearly every day  Being so restless that it's hard to sit still: Several days  Becoming easily annoyed or irritable: More than half the days  Feeling afraid as if something awful might happen: More than half the days  Total: 17    Past Medical History:   Diagnosis Date   • Apnea    • Atherosclerosis of aorta (HCC)    • Atrial fibrillation and flutter (HCC)    • Bipolar 1 disorder (HCC)    • GERD (gastroesophageal reflux disease)    • Hx of colonic polyp 8/5/2013    Formatting of this note might be different from the original. Had colonoscopy in 2013; 3 polyps removed in the colon [place] 2015, recommend repeat in 2020   • Pain     myofacial pain syndrome   • Prurigo nodularis    • Reactive airway disease      Social History     Tobacco Use   • Smoking status: Former Smoker   • Smokeless tobacco: Never Used   • Tobacco comment: 40 years ago   Vaping Use   • Vaping Use: Never used   Substance Use Topics   • Alcohol use: Not Currently     Comment: recovering alcoholic, sober for 8 years   • Drug use: Not Currently     Current Outpatient Medications Ordered in Epic   Medication Sig Dispense Refill   • pantoprazole (PROTONIX) 20 MG tablet Take 20 mg by mouth every day.     • Empagliflozin (JARDIANCE) 10 MG Tab Take 10 mg by mouth every day. 90 Tablet 1   • escitalopram (LEXAPRO) 5 MG tablet Take 1 Tablet by mouth every day. 30 Tablet 0   • dupilumab (DUPIXENT) 300 MG/2ML Solution Prefilled Syringe injection Inject 1 Syringe under the skin every 14 days. 4 mL 11   • lurasidone (LATUDA) 40 MG Tab Take 1 tablet by mouth with dinner. 90 tablet 0   • atorvastatin  "(LIPITOR) 20 MG Tab Take 1 tablet by mouth every day. 90 tablet 1   • apixaban (ELIQUIS) 5mg Tab Take 5 mg by mouth 2 times a day.     • colesevelam (WELCHOL) 625 MG Tab Take 625 mg by mouth 2 times a day with meals.     • metoprolol SR (TOPROL XL) 25 MG TABLET SR 24 HR Take 25 mg by mouth every day.     • traZODone (DESYREL) 50 MG Tab Take 1 tablet by mouth at bedtime as needed for Sleep (as needed for sleep). Take 1/2 to 1 tablet by mouth at bedtime. 60 tablet 1     No current Epic-ordered facility-administered medications on file.     Patient has no known allergies.    Health Maintenance: High-dose flu shot today, annual wellness visit at the end of the year.  Patient has order for mammogram and bone density already    ROS: See HPI  Gen: no fevers/chills, no changes in weight  Pulm: no sob, no cough  CV: no chest pain, no palpitations, no edema  GI: See HPI  Skin: no rash, no lesions  Neuro: no headaches, no numbness/tingling  Heme/Lymph: no easy bruising or bleeding  Objective:   Exam:  /70   Pulse 60   Temp 36.6 °C (97.9 °F)   Resp 16   Ht 1.626 m (5' 4\")   Wt 103 kg (227 lb)   LMP  (LMP Unknown)   SpO2 99%   BMI 38.96 kg/m²    Body mass index is 38.96 kg/m².    Gen: Alert and oriented, No apparent distress.  HEENT: Head atraumatic, normocephalic. Pupils equal and round.  Neck: Neck is supple without lymphadenopathy.   Lungs: Normal effort, CTA bilaterally, no wheezes, rhonchi, or rales  CV: Regular rate and rhythm. No murmurs, rubs, or gallops.  ABD: +BS.  Mild tenderness left upper quadrant, non-distended. No rebound, rigidity, or guarding.  Ext: No clubbing, cyanosis, edema.  Assessment & Plan:     66 y.o. female with the following -     1. Left upper quadrant abdominal pain  Patient with left upper quadrant abdominal pain with associated bloating, pelvic discomfort, diarrhea, nausea.  No change with increase of PPI.  Associated elevated sed rate, CRP, GGT, alkaline phosphatase.  Will check CT " abdomen pelvis to rule out inflammatory conditions.  - CT-ABDOMEN-PELVIS WITH & W/O; Future    2. Abdominal bloating  - CT-ABDOMEN-PELVIS WITH & W/O; Future  - POCT Urinalysis    3. Nausea  - CT-ABDOMEN-PELVIS WITH & W/O; Future  - POCT Urinalysis    4. Diarrhea, unspecified type  - CT-ABDOMEN-PELVIS WITH & W/O; Future    5. Pelvic pain  - CT-ABDOMEN-PELVIS WITH & W/O; Future    6. Elevated serum GGT level  - CT-ABDOMEN-PELVIS WITH & W/O; Future    7. Bipolar affective disorder, currently depressed, moderate (HCC)  Currently on Latuda 40 mg daily.  Add Lexapro 5 mg daily.  Side effects such as nausea, headache, hypomanic symptoms discussed with patient.  Referral to psychology.  Reevaluate in 2 weeks.  - escitalopram (LEXAPRO) 5 MG tablet; Take 1 Tablet by mouth every day.  Dispense: 30 Tablet; Refill: 0  - REFERRAL TO PSYCHOLOGY    8. Alcohol dependence in remission (HCC)  - REFERRAL TO PSYCHOLOGY    9. IGT (impaired glucose tolerance)  A1c increased 6.4.  Patient would not be able to tolerate Metformin due to chronic GI symptoms of diarrhea, abdominal pain, nausea.  We will trial Jardiance 10 mg daily.  - Empagliflozin (JARDIANCE) 10 MG Tab; Take 10 mg by mouth every day.  Dispense: 90 Tablet; Refill: 1    10. Hyperglycemia  - Empagliflozin (JARDIANCE) 10 MG Tab; Take 10 mg by mouth every day.  Dispense: 90 Tablet; Refill: 1    11. Elevated C-reactive protein (CRP)    12. Elevated sed rate    13. Prurigo nodularis  - dupilumab (DUPIXENT) 300 MG/2ML Solution Prefilled Syringe injection; Inject 1 Syringe under the skin every 14 days.  Dispense: 4 mL; Refill: 11    14. Need for vaccination  - Influenza Vaccine, High Dose (65+ Only)      Return for after CT scan.    Alejandra Frederick PA-C ([place])  Physician Assistant Certified  [facility]    Please note that this dictation was created using voice recognition software. I have made every reasonable attempt to correct obvious errors, but I expect that there are  errors of grammar and possibly content that I did not discover before finalizing the note.

## 2021-09-29 NOTE — ASSESSMENT & PLAN NOTE
Most recent labs show A1c of 6.4.  Patient states that she makes her own meals.  Eats several servings of fruits and vegetables a day.  Walks around 5 miles a day.

## 2021-09-29 NOTE — ASSESSMENT & PLAN NOTE
Patient with persistent left upper quadrant pain with associated nausea, diarrhea, bloating.  No change with increase of Protonix to twice a day.  She did see gastroenterology and had a normal abdominal x-ray and normal pancreatic elastase.  Celiac testing was negative.  Previous labs showed elevated GGT and alkaline phosphatase as well as elevated sed rate and CRP.

## 2021-09-29 NOTE — PATIENT INSTRUCTIONS
Once resulted, your lab/test/imaging results will show up automatically in your MyChart. Please wait for my interpretation and recommendations prior to viewing your results to avoid any unnecessary confusion or misinterpretation. I will address all of the lab values that I interpret as abnormal and message you accordingly on your MyChart. I will always send you a message on your labs even if they are normal. If you do not hear back from me within 5 business days after getting your labs drawn, then please send me a message on MyChart so I can obtain your labs (especially if you went to an outside lab - [facility], [facility], etc). If you have any additional questions or concerns beyond my interpretation of your results, please make an appointment with me to discuss in further detail.    Please only use the [employer] messaging system for questions regarding your most recent appointment or if advised to use otherwise (glucose or blood pressure reporting). If you have any new problems or concerns, you must make an appointment to discuss. This includes any referral requests.     Thank you,    Alejandra Frederick PA-C ([place])  Physician Assistant Certified  [facility]

## 2021-09-29 NOTE — ASSESSMENT & PLAN NOTE
Patient states that most recently she has been in more of a depressive phase.  She is currently on Latuda 40 mg daily.  She states that her mood tends to become more depressed when the seasons change.  She is interested in seeing a therapist again.

## 2021-10-05 DIAGNOSIS — F31.32 BIPOLAR AFFECTIVE DISORDER, CURRENTLY DEPRESSED, MODERATE (HCC): ICD-10-CM

## 2021-10-05 DIAGNOSIS — F41.1 GENERALIZED ANXIETY DISORDER: ICD-10-CM

## 2021-10-05 RX ORDER — LURASIDONE HYDROCHLORIDE 40 MG/1
TABLET, FILM COATED ORAL
Qty: 30 TABLET | Refills: 2 | Status: SHIPPED | OUTPATIENT
Start: 2021-10-05 | End: 2021-10-15 | Stop reason: SDUPTHER

## 2021-10-06 ENCOUNTER — HOSPITAL ENCOUNTER (OUTPATIENT)
Dept: RADIOLOGY | Facility: MEDICAL CENTER | Age: 66
End: 2021-10-06
Attending: PHYSICIAN ASSISTANT
Payer: MEDICARE

## 2021-10-06 DIAGNOSIS — R74.8 ELEVATED SERUM GGT LEVEL: ICD-10-CM

## 2021-10-06 DIAGNOSIS — R19.7 DIARRHEA, UNSPECIFIED TYPE: ICD-10-CM

## 2021-10-06 DIAGNOSIS — R10.12 LEFT UPPER QUADRANT ABDOMINAL PAIN: ICD-10-CM

## 2021-10-06 DIAGNOSIS — R11.0 NAUSEA: ICD-10-CM

## 2021-10-06 DIAGNOSIS — R10.2 PELVIC PAIN: ICD-10-CM

## 2021-10-06 DIAGNOSIS — R14.0 ABDOMINAL BLOATING: ICD-10-CM

## 2021-10-06 PROCEDURE — 74177 CT ABD & PELVIS W/CONTRAST: CPT | Mod: MG

## 2021-10-06 PROCEDURE — 700117 HCHG RX CONTRAST REV CODE 255: Performed by: PHYSICIAN ASSISTANT

## 2021-10-06 RX ADMIN — IOHEXOL 25 ML: 240 INJECTION, SOLUTION INTRATHECAL; INTRAVASCULAR; INTRAVENOUS; ORAL at 14:41

## 2021-10-06 RX ADMIN — IOHEXOL 100 ML: 350 INJECTION, SOLUTION INTRAVENOUS at 14:40

## 2021-10-15 ENCOUNTER — OFFICE VISIT (OUTPATIENT)
Dept: MEDICAL GROUP | Facility: IMAGING CENTER | Age: 66
End: 2021-10-15
Payer: MEDICARE

## 2021-10-15 ENCOUNTER — HOSPITAL ENCOUNTER (OUTPATIENT)
Dept: LAB | Facility: MEDICAL CENTER | Age: 66
End: 2021-10-15
Attending: PHYSICIAN ASSISTANT
Payer: MEDICARE

## 2021-10-15 VITALS
HEIGHT: 64 IN | BODY MASS INDEX: 38.58 KG/M2 | HEART RATE: 57 BPM | SYSTOLIC BLOOD PRESSURE: 120 MMHG | WEIGHT: 226 LBS | DIASTOLIC BLOOD PRESSURE: 72 MMHG | TEMPERATURE: 97.4 F | OXYGEN SATURATION: 97 %

## 2021-10-15 DIAGNOSIS — R74.8 ELEVATED ALKALINE PHOSPHATASE LEVEL: ICD-10-CM

## 2021-10-15 DIAGNOSIS — R70.0 ELEVATED SED RATE: ICD-10-CM

## 2021-10-15 DIAGNOSIS — R14.0 ABDOMINAL BLOATING: ICD-10-CM

## 2021-10-15 DIAGNOSIS — R79.82 ELEVATED C-REACTIVE PROTEIN (CRP): ICD-10-CM

## 2021-10-15 DIAGNOSIS — K44.9 HIATAL HERNIA: ICD-10-CM

## 2021-10-15 DIAGNOSIS — F31.32 BIPOLAR AFFECTIVE DISORDER, CURRENTLY DEPRESSED, MODERATE (HCC): ICD-10-CM

## 2021-10-15 DIAGNOSIS — F41.1 GENERALIZED ANXIETY DISORDER: ICD-10-CM

## 2021-10-15 DIAGNOSIS — R74.8 ELEVATED SERUM GGT LEVEL: ICD-10-CM

## 2021-10-15 DIAGNOSIS — R10.12 LEFT UPPER QUADRANT ABDOMINAL PAIN: ICD-10-CM

## 2021-10-15 LAB
CRP SERPL HS-MCNC: 1.32 MG/DL (ref 0–0.75)
ERYTHROCYTE [SEDIMENTATION RATE] IN BLOOD BY WESTERGREN METHOD: 23 MM/HOUR (ref 0–25)
RHEUMATOID FACT SER IA-ACNC: 11 IU/ML (ref 0–14)

## 2021-10-15 PROCEDURE — 86140 C-REACTIVE PROTEIN: CPT

## 2021-10-15 PROCEDURE — 99214 OFFICE O/P EST MOD 30 MIN: CPT | Performed by: PHYSICIAN ASSISTANT

## 2021-10-15 PROCEDURE — 86431 RHEUMATOID FACTOR QUANT: CPT

## 2021-10-15 PROCEDURE — 36415 COLL VENOUS BLD VENIPUNCTURE: CPT

## 2021-10-15 PROCEDURE — 86200 CCP ANTIBODY: CPT

## 2021-10-15 PROCEDURE — 83516 IMMUNOASSAY NONANTIBODY: CPT

## 2021-10-15 PROCEDURE — 85652 RBC SED RATE AUTOMATED: CPT

## 2021-10-15 PROCEDURE — 86038 ANTINUCLEAR ANTIBODIES: CPT

## 2021-10-15 RX ORDER — LURASIDONE HYDROCHLORIDE 60 MG/1
60 TABLET, FILM COATED ORAL
Qty: 30 TABLET | Refills: 2 | Status: SHIPPED | OUTPATIENT
Start: 2021-10-15 | End: 2022-03-01

## 2021-10-15 ASSESSMENT — PAIN SCALES - GENERAL: PAINLEVEL: NO PAIN

## 2021-10-15 ASSESSMENT — FIBROSIS 4 INDEX: FIB4 SCORE: 1.07

## 2021-10-15 NOTE — PATIENT INSTRUCTIONS
Once resulted, your lab/test/imaging results will show up automatically in your MyChart. Please wait for my interpretation and recommendations prior to viewing your results to avoid any unnecessary confusion or misinterpretation. I will address all of the lab values that I interpret as abnormal and message you accordingly on your MyChart. I will always send you a message on your labs even if they are normal. If you do not hear back from me within 5 business days after getting your labs drawn, then please send me a message on MyChart so I can obtain your labs (especially if you went to an outside lab - [facility], [facility], etc). If you have any additional questions or concerns beyond my interpretation of your results, please make an appointment with me to discuss in further detail.    Please only use the "[employer] messaging system for questions regarding your most recent appointment or if advised to use otherwise (glucose or blood pressure reporting). If you have any new problems or concerns, you must make an appointment to discuss. This includes any referral requests.     Thank you,    Alejandra Frederick PA-C ([place])  Physician Assistant Certified  [facility]

## 2021-10-15 NOTE — ASSESSMENT & PLAN NOTE
Patient admits to persistent abdominal bloating.  She gets occasional discomfort in the left upper quadrant.  She has CT of the abdomen pelvis which showed a small hiatal hernia and diverticula without diverticulitis of the sigmoid colon.  She had a contracted gallbladder on imaging.  History of nausea but not currently.  No diarrhea.    Of note she has had negative celiac testing in the past.  Has had a history of elevated sed rate and CRP, as well as elevation of GGT and alkaline phosphatase.  Currently on pantoprazole 20 mg daily.

## 2021-10-15 NOTE — PROGRESS NOTES
Subjective:     CC:   Chief Complaint   Patient presents with   • Lab Results       HPI:   Akua presents today to discuss:    Abdominal bloating  Patient admits to persistent abdominal bloating.  She gets occasional discomfort in the left upper quadrant.  She has CT of the abdomen pelvis which showed a small hiatal hernia and diverticula without diverticulitis of the sigmoid colon.  She had a contracted gallbladder on imaging.  History of nausea but not currently.  No diarrhea.    Of note she has had negative celiac testing in the past.  Has had a history of elevated sed rate and CRP, as well as elevation of GGT and alkaline phosphatase.  Currently on pantoprazole 20 mg daily.    Bipolar affective disorder, currently depressed, moderate (HCC)  Patient with a history of bipolar disorder, currently mixed.  Patient started on Lexapro 5 mg daily due to pronounced depression symptoms.  Patient states that since starting Lexapro she has noticed increased warmth/flushed sensation and anxiety.  No racing thoughts.      Past Medical History:   Diagnosis Date   • Apnea    • Atherosclerosis of aorta (HCC)    • Atrial fibrillation and flutter (HCC)    • Bipolar 1 disorder (HCC)    • GERD (gastroesophageal reflux disease)    • Hx of colonic polyp 8/5/2013    Formatting of this note might be different from the original. Had colonoscopy in 2013; 3 polyps removed in the colon [place] 2015, recommend repeat in 2020   • Pain     myofacial pain syndrome   • Prurigo nodularis    • Reactive airway disease      Social History     Tobacco Use   • Smoking status: Former Smoker   • Smokeless tobacco: Never Used   • Tobacco comment: 40 years ago   Vaping Use   • Vaping Use: Never used   Substance Use Topics   • Alcohol use: Not Currently     Comment: recovering alcoholic, sober for 8 years   • Drug use: Not Currently     Current Outpatient Medications Ordered in Epic   Medication Sig Dispense Refill   • Lurasidone HCl (LATUDA) 60 MG Tab Take  "60 mg by mouth with dinner. 30 Tablet 2   • pantoprazole (PROTONIX) 20 MG tablet Take 20 mg by mouth every day.     • Empagliflozin (JARDIANCE) 10 MG Tab Take 10 mg by mouth every day. 90 Tablet 1   • dupilumab (DUPIXENT) 300 MG/2ML Solution Prefilled Syringe injection Inject 1 Syringe under the skin every 14 days. 4 mL 11   • atorvastatin (LIPITOR) 20 MG Tab Take 1 tablet by mouth every day. 90 tablet 1   • apixaban (ELIQUIS) 5mg Tab Take 5 mg by mouth 2 times a day.     • metoprolol SR (TOPROL XL) 25 MG TABLET SR 24 HR Take 25 mg by mouth every day.     • traZODone (DESYREL) 50 MG Tab Take 1 tablet by mouth at bedtime as needed for Sleep (as needed for sleep). Take 1/2 to 1 tablet by mouth at bedtime. 60 tablet 1     No current Epic-ordered facility-administered medications on file.     Patient has no known allergies.    ROS: See HPI  Gen: no fevers/chills, no changes in weight  Eyes: no changes in vision  Pulm: no sob, no cough  CV: no chest pain, no palpitations, no edema  GI: See HPI  Skin: no rash, no lesions  Neuro: no headaches, no numbness/tingling  Heme/Lymph: no easy bruising or bleeding    Objective:   Exam:  /72 (BP Location: Left arm, Patient Position: Sitting, BP Cuff Size: Adult)   Pulse (!) 57   Temp 36.3 °C (97.4 °F) (Temporal)   Ht 1.626 m (5' 4\")   Wt 103 kg (226 lb)   LMP  (LMP Unknown)   SpO2 97%   BMI 38.79 kg/m²    Body mass index is 38.79 kg/m².    Gen: Alert and oriented, No apparent distress.  HEENT: Head atraumatic, normocephalic. Pupils equal and round.  Neck: Neck is supple without lymphadenopathy.   Lungs: Normal effort, CTA bilaterally, no wheezes, rhonchi, or rales  CV: Regular rate and rhythm. No murmurs, rubs, or gallops.  ABD: +BS. Non-tender, non-distended. No rebound, rigidity, or guarding.  Ext: No clubbing, cyanosis, edema.    Assessment & Plan:     66 y.o. female with the following -     1. Bipolar affective disorder, currently depressed, moderate (HCC)  Patient " with more anxiety triggered by SSRI.  We will stop Lexapro at this time and increase Latuda to 60 mg daily.  Recheck in 3 to 4 weeks.  - Lurasidone HCl (LATUDA) 60 MG Tab; Take 60 mg by mouth with dinner.  Dispense: 30 Tablet; Refill: 2    2. Generalized anxiety disorder  - Lurasidone HCl (LATUDA) 60 MG Tab; Take 60 mg by mouth with dinner.  Dispense: 30 Tablet; Refill: 2    3. Abdominal bloating  Patient abdominal bloating and history of elevated GGT and alkaline phosphatase with contracted gallbladder on CT.  Check ultrasound, refer to new gastroenterologist per patient request.  Would recommend endoscopy if persists.  - MITOCHONDRIAL (M2) AB; Future  - ANTI-SMOOTH MUSCLE ABS; Future  - US-ABDOMEN COMPLETE SURVEY; Future  - REFERRAL TO GASTROENTEROLOGY    4. Left upper quadrant abdominal pain  - REFERRAL TO GASTROENTEROLOGY    5. Hiatal hernia  Noted on imaging.  Continue pantoprazole 20 mg daily.    6. Elevated serum GGT level  - MITOCHONDRIAL (M2) AB; Future  - ANTI-SMOOTH MUSCLE ABS; Future  - REFERRAL TO GASTROENTEROLOGY    7. Elevated C-reactive protein (CRP)  - CRP QUANTITIVE (NON-CARDIAC); Future  - ELBA REFLEXIVE PROFILE; Future  - RHEUMATOID FACTOR QUANT; Future  - CCP ANTIBODY; Future    8. Elevated sed rate  - Sed Rate; Future  - ELBA REFLEXIVE PROFILE; Future  - RHEUMATOID FACTOR QUANT; Future  - CCP ANTIBODY; Future    9. Elevated alkaline phosphatase level  - MITOCHONDRIAL (M2) AB; Future  - ANTI-SMOOTH MUSCLE ABS; Future  - REFERRAL TO GASTROENTEROLOGY    Return in about 3 weeks (around 11/5/2021) for Follow-up.    Alejandra Frederick PA-C ([place])  Physician Assistant Certified  [facility]    Please note that this dictation was created using voice recognition software. I have made every reasonable attempt to correct obvious errors, but I expect that there are errors of grammar and possibly content that I did not discover before finalizing the note.

## 2021-10-15 NOTE — ASSESSMENT & PLAN NOTE
Patient with a history of bipolar disorder, currently mixed.  Patient started on Lexapro 5 mg daily due to pronounced depression symptoms.  Patient states that since starting Lexapro she has noticed increased warmth/flushed sensation and anxiety.  No racing thoughts.

## 2021-10-18 LAB
CCP IGG SERPL-ACNC: 2 UNITS (ref 0–19)
MITOCHONDRIA M2 IGG SER-ACNC: 1.2 UNITS (ref 0–24.9)
NUCLEAR IGG SER QL IA: NORMAL
SMA IGG SER-ACNC: 5 UNITS (ref 0–19)

## 2021-10-25 ENCOUNTER — HOSPITAL ENCOUNTER (OUTPATIENT)
Dept: RADIOLOGY | Facility: MEDICAL CENTER | Age: 66
End: 2021-10-25
Attending: INTERNAL MEDICINE
Payer: COMMERCIAL

## 2021-10-25 DIAGNOSIS — Z91.89 10 YEAR RISK OF MI OR STROKE 7.5% OR GREATER: ICD-10-CM

## 2021-10-25 PROCEDURE — 4410556 CT-CARDIAC SCORING (SELF PAY ONLY)

## 2021-10-28 ENCOUNTER — PATIENT MESSAGE (OUTPATIENT)
Dept: CARDIOLOGY | Facility: MEDICAL CENTER | Age: 66
End: 2021-10-28

## 2021-11-01 NOTE — PATIENT COMMUNICATION
Mrs Winter,  Great news! Your calcium score is 0. Can stop your statin. Can repeat this test every 5 years to monitor progression. Continue to check your cholesterol annually.  Best,  Dr Gomes   Written by Guillermo Gomes M.D. on 10/28/2021  9:42 AM PDT  Seen by patient Akua Winter on 10/28/2021  1:39 PM

## 2021-11-09 ENCOUNTER — APPOINTMENT (OUTPATIENT)
Dept: RADIOLOGY | Facility: MEDICAL CENTER | Age: 66
End: 2021-11-09
Attending: PHYSICIAN ASSISTANT
Payer: MEDICARE

## 2021-12-03 ENCOUNTER — OFFICE VISIT (OUTPATIENT)
Dept: CARDIOLOGY | Facility: MEDICAL CENTER | Age: 66
End: 2021-12-03
Payer: MEDICARE

## 2021-12-03 VITALS
HEIGHT: 64 IN | WEIGHT: 230 LBS | DIASTOLIC BLOOD PRESSURE: 68 MMHG | HEART RATE: 67 BPM | SYSTOLIC BLOOD PRESSURE: 122 MMHG | OXYGEN SATURATION: 97 % | BODY MASS INDEX: 39.27 KG/M2

## 2021-12-03 DIAGNOSIS — R73.03 PREDIABETES: ICD-10-CM

## 2021-12-03 DIAGNOSIS — R93.1 AGATSTON CAC SCORE, <100: ICD-10-CM

## 2021-12-03 DIAGNOSIS — E78.2 MIXED HYPERLIPIDEMIA: ICD-10-CM

## 2021-12-03 DIAGNOSIS — I10 HYPERTENSION, UNSPECIFIED TYPE: ICD-10-CM

## 2021-12-03 DIAGNOSIS — I48.92 ATRIAL FLUTTER, UNSPECIFIED TYPE (HCC): ICD-10-CM

## 2021-12-03 DIAGNOSIS — Z91.89 10 YEAR RISK OF MI OR STROKE 7.5% OR GREATER: ICD-10-CM

## 2021-12-03 PROCEDURE — 99214 OFFICE O/P EST MOD 30 MIN: CPT | Performed by: INTERNAL MEDICINE

## 2021-12-03 RX ORDER — LURASIDONE HYDROCHLORIDE 40 MG/1
TABLET, FILM COATED ORAL
COMMUNITY
Start: 2021-11-27 | End: 2021-12-03

## 2021-12-03 ASSESSMENT — ENCOUNTER SYMPTOMS
DYSPNEA ON EXERTION: 0
IRREGULAR HEARTBEAT: 0
WEIGHT GAIN: 0
SYNCOPE: 0
HEARTBURN: 0
ORTHOPNEA: 0
ALTERED MENTAL STATUS: 0
DECREASED APPETITE: 0
CONSTIPATION: 0
WEIGHT LOSS: 0
ABDOMINAL PAIN: 0
DEPRESSION: 0
BACK PAIN: 0
PND: 0
FEVER: 0
DIARRHEA: 0
BLURRED VISION: 0
SHORTNESS OF BREATH: 0
CLAUDICATION: 0
VOMITING: 0
COUGH: 0
DIZZINESS: 0
FLANK PAIN: 0
NAUSEA: 0
PALPITATIONS: 0
NEAR-SYNCOPE: 0

## 2021-12-03 ASSESSMENT — FIBROSIS 4 INDEX: FIB4 SCORE: 1.07

## 2021-12-03 NOTE — PROGRESS NOTES
Cardiology Note    Chief Complaint   Patient presents with   • HTN (Controlled)       History of Present Illness: Akua Winter is a 66 y.o. female PMH atrial flutter s/p DCCV, HLD, HTN, GERD, DAMIÁN on cpap, aortic atherosclerosis who presents for initial visit.    Doing well this visit. No active cardiac complaints. Compliant with medications and denies adverse effects. States on jardiance about two months. Stays active but tires easily and estimates she is deconditioned.     Previously noted: She is recovered alcoholic; sober 9 years. Active member of AA and feels privileged to help others overcome alcoholism.    Review of Systems   Constitutional: Negative for decreased appetite, fever, malaise/fatigue, weight gain and weight loss.   HENT: Negative for congestion and nosebleeds.    Eyes: Negative for blurred vision.   Cardiovascular: Negative for chest pain, claudication, dyspnea on exertion, irregular heartbeat, leg swelling, near-syncope, orthopnea, palpitations, paroxysmal nocturnal dyspnea and syncope.   Respiratory: Negative for cough and shortness of breath.    Endocrine: Negative for cold intolerance and heat intolerance.   Skin: Negative for rash.   Musculoskeletal: Negative for back pain.   Gastrointestinal: Negative for abdominal pain, constipation, diarrhea, heartburn, melena, nausea and vomiting.   Genitourinary: Negative for dysuria, flank pain and hematuria.   Neurological: Negative for dizziness.   Psychiatric/Behavioral: Negative for altered mental status and depression.         Past Medical History:   Diagnosis Date   • Apnea    • Atherosclerosis of aorta (HCC)    • Atrial fibrillation and flutter (HCC)    • Bipolar 1 disorder (HCC)    • GERD (gastroesophageal reflux disease)    • Hx of colonic polyp 8/5/2013    Formatting of this note might be different from the original. Had colonoscopy in 2013; 3 polyps removed in the colon [place] 2015, recommend repeat in 2020   • Pain     myofacial pain  syndrome   • Prurigo nodularis    • Reactive airway disease          History reviewed. No pertinent surgical history.      Current Outpatient Medications   Medication Sig Dispense Refill   • Lurasidone HCl (LATUDA) 60 MG Tab Take 60 mg by mouth with dinner. 30 Tablet 2   • pantoprazole (PROTONIX) 20 MG tablet Take 20 mg by mouth every day.     • Empagliflozin (JARDIANCE) 10 MG Tab Take 10 mg by mouth every day. 90 Tablet 1   • dupilumab (DUPIXENT) 300 MG/2ML Solution Prefilled Syringe injection Inject 1 Syringe under the skin every 14 days. 4 mL 11   • apixaban (ELIQUIS) 5mg Tab Take 5 mg by mouth 2 times a day.     • metoprolol SR (TOPROL XL) 25 MG TABLET SR 24 HR Take 25 mg by mouth every day.     • traZODone (DESYREL) 50 MG Tab Take 1 tablet by mouth at bedtime as needed for Sleep (as needed for sleep). Take 1/2 to 1 tablet by mouth at bedtime. 60 tablet 1     No current facility-administered medications for this visit.         No Known Allergies      Family History   Problem Relation Age of Onset   • Stroke Mother    • Heart Disease Father          Social History     Socioeconomic History   • Marital status:      Spouse name: Not on file   • Number of children: Not on file   • Years of education: Not on file   • Highest education level: Not on file   Occupational History   • Not on file   Tobacco Use   • Smoking status: Former Smoker   • Smokeless tobacco: Never Used   • Tobacco comment: 40 years ago   Vaping Use   • Vaping Use: Never used   Substance and Sexual Activity   • Alcohol use: Not Currently     Comment: recovering alcoholic, sober for 8 years   • Drug use: Not Currently   • Sexual activity: Not Currently   Other Topics Concern   • Not on file   Social History Narrative   • Not on file     Social Determinants of Health     Financial Resource Strain:    • Difficulty of Paying Living Expenses: Not on file   Food Insecurity:    • Worried About Running Out of Food in the Last Year: Not on file   •  "Ran Out of Food in the Last Year: Not on file   Transportation Needs:    • Lack of Transportation (Medical): Not on file   • Lack of Transportation (Non-Medical): Not on file   Physical Activity:    • Days of Exercise per Week: Not on file   • Minutes of Exercise per Session: Not on file   Stress:    • Feeling of Stress : Not on file   Social Connections:    • Frequency of Communication with Friends and Family: Not on file   • Frequency of Social Gatherings with Friends and Family: Not on file   • Attends Church Services: Not on file   • Active Member of Clubs or Organizations: Not on file   • Attends Club or Organization Meetings: Not on file   • Marital Status: Not on file   Intimate Partner Violence:    • Fear of Current or Ex-Partner: Not on file   • Emotionally Abused: Not on file   • Physically Abused: Not on file   • Sexually Abused: Not on file   Housing Stability:    • Unable to Pay for Housing in the Last Year: Not on file   • Number of Places Lived in the Last Year: Not on file   • Unstable Housing in the Last Year: Not on file         Physical Exam:  Ambulatory Vitals  /68 (BP Location: Left arm, Patient Position: Sitting, BP Cuff Size: Adult)   Pulse 67   Ht 1.626 m (5' 4\")   Wt 104 kg (230 lb)   SpO2 97%    BP Readings from Last 4 Encounters:   12/03/21 122/68   10/15/21 120/72   09/29/21 128/70   06/29/21 124/68     Weight/BMI:   Vitals:    12/03/21 1241   BP: 122/68   Weight: 104 kg (230 lb)   Height: 1.626 m (5' 4\")    Body mass index is 39.48 kg/m².  Wt Readings from Last 4 Encounters:   12/03/21 104 kg (230 lb)   10/15/21 103 kg (226 lb)   09/29/21 103 kg (227 lb)   06/29/21 101 kg (223 lb)       Physical Exam  Constitutional:       General: She is not in acute distress.  HENT:      Head: Normocephalic and atraumatic.   Eyes:      Conjunctiva/sclera: Conjunctivae normal.      Pupils: Pupils are equal, round, and reactive to light.   Neck:      Vascular: No JVD.   Cardiovascular:      " Rate and Rhythm: Normal rate and regular rhythm.      Heart sounds: Normal heart sounds. No murmur heard.  No friction rub. No gallop.    Pulmonary:      Effort: Pulmonary effort is normal. No respiratory distress.      Breath sounds: Normal breath sounds. No wheezing or rales.   Chest:      Chest wall: No tenderness.   Abdominal:      General: Bowel sounds are normal. There is no distension.      Palpations: Abdomen is soft.   Musculoskeletal:      Cervical back: Normal range of motion and neck supple.   Skin:     General: Skin is warm and dry.   Neurological:      Mental Status: She is alert and oriented to person, place, and time.   Psychiatric:         Mood and Affect: Affect normal.         Judgment: Judgment normal.         Lab Data Review:  Lab Results   Component Value Date/Time    CHOLSTRLTOT 163 09/24/2021 06:44 AM    LDL 73 09/24/2021 06:44 AM    HDL 56 09/24/2021 06:44 AM    TRIGLYCERIDE 172 (H) 09/24/2021 06:44 AM       Lab Results   Component Value Date/Time    SODIUM 141 09/24/2021 06:44 AM    POTASSIUM 4.3 09/24/2021 06:44 AM    CHLORIDE 103 09/24/2021 06:44 AM    CO2 26 09/24/2021 06:44 AM    GLUCOSE 114 (H) 09/24/2021 06:44 AM    BUN 12 09/24/2021 06:44 AM    CREATININE 0.87 09/24/2021 06:44 AM     CrCl cannot be calculated (Patient's most recent lab result is older than the maximum 7 days allowed.).  Lab Results   Component Value Date/Time    ALKPHOSPHAT 155 (H) 09/24/2021 06:44 AM    ASTSGOT 19 09/24/2021 06:44 AM    ALTSGPT 24 09/24/2021 06:44 AM    TBILIRUBIN 0.5 09/24/2021 06:44 AM      Lab Results   Component Value Date/Time    WBC 6.3 09/24/2021 06:44 AM     Lab Results   Component Value Date/Time    HBA1C 6.4 (H) 09/24/2021 06:44 AM     No components found for: TROP      Cardiac Imaging and Procedures Review:      EKG 8/2020 outside EKG showing atrial flutter  EKG 5/12/21 reviewed by GEOVANNA barajas    TTE 10/2020  Conclusions   Summary   Normal LV size and function with preserved EF  60-65%.   Normal diastolic function.   No significant valvular disease.   Normal RA and PA pressures.     CAC CT 10/2021  Coronary calcification:  LMA - 0.0  LCX - 0.0  LAD - 0.0  RCA - 0.0  PDA - 0.0  Total Calcium Score: 0.0    Medical Decision Making:  Problem List Items Addressed This Visit     Mixed hyperlipidemia    Hypertension    Atrial flutter (HCC)    Relevant Orders    REFERRAL TO CARDIOLOGY    10 year risk of MI or stroke 7.5% or greater    Agatston CAC score, <100    Prediabetes        Prediabetes / intermed 10 year risk ascvd with CAC CT agatston score 0 - discuss with PMD and consider holding antiglycemics followed by a1c to verify if truly diabetic. If so, then has indication for statin and sglt2i. Otherwise dont see indication.     Hx atrial flutter - prior documentations of fibrillation however she brought EKG appears showing flutter. Refer to EP for ablation. Consider dc doac thereafter.     HTN - controlled. Goal 120/80.     It was my pleasure to meet with Ms. Winter.

## 2021-12-03 NOTE — PATIENT INSTRUCTIONS
Cardiac Ablation  Cardiac ablation is a procedure to disable (ablate) a small amount of heart tissue in very specific places. The heart has many electrical connections. Sometimes these connections are abnormal and can cause the heart to beat very fast or irregularly. Ablating some of the problem areas can improve the heart rhythm or return it to normal. Ablation may be done for people who:  · Have Darian-Parkinson-White syndrome.  · Have fast heart rhythms (tachycardia).  · Have taken medicines for an abnormal heart rhythm (arrhythmia) that were not effective or caused side effects.  · Have a high-risk heartbeat that may be life-threatening.  During the procedure, a small incision is made in the neck or the groin, and a long, thin, flexible tube (catheter) is inserted into the incision and moved to the heart. Small devices (electrodes) on the tip of the catheter will send out electrical currents. A type of X-ray (fluoroscopy) will be used to help guide the catheter and to provide images of the heart.  Tell a health care provider about:  · Any allergies you have.  · All medicines you are taking, including vitamins, herbs, eye drops, creams, and over-the-counter medicines.  · Any problems you or family members have had with anesthetic medicines.  · Any blood disorders you have.  · Any surgeries you have had.  · Any medical conditions you have, such as kidney failure.  · Whether you are pregnant or may be pregnant.  What are the risks?  Generally, this is a safe procedure. However, problems may occur, including:  · Infection.  · Bruising and bleeding at the catheter insertion site.  · Bleeding into the chest, especially into the sac that surrounds the heart. This is a serious complication.  · Stroke or blood clots.  · Damage to other structures or organs.  · Allergic reaction to medicines or dyes.  · Need for a permanent pacemaker if the normal electrical system is damaged. A pacemaker is a small computer that sends  electrical signals to the heart and helps your heart beat normally.  · The procedure not being fully effective. This may not be recognized until months later. Repeat ablation procedures are sometimes required.  What happens before the procedure?  · Follow instructions from your health care provider about eating or drinking restrictions.  · Ask your health care provider about:  ? Changing or stopping your regular medicines. This is especially important if you are taking diabetes medicines or blood thinners.  ? Taking medicines such as aspirin and ibuprofen. These medicines can thin your blood. Do not take these medicines before your procedure if your health care provider instructs you not to.  · Plan to have someone take you home from the hospital or clinic.  · If you will be going home right after the procedure, plan to have someone with you for 24 hours.  What happens during the procedure?  · To lower your risk of infection:  ? Your health care team will wash or sanitize their hands.  ? Your skin will be washed with soap.  ? Hair may be removed from the incision area.  · An IV tube will be inserted into one of your veins.  · You will be given a medicine to help you relax (sedative).  · The skin on your neck or groin will be numbed.  · An incision will be made in your neck or your groin.  · A needle will be inserted through the incision and into a large vein in your neck or groin.  · A catheter will be inserted into the needle and moved to your heart.  · Dye may be injected through the catheter to help your surgeon see the area of the heart that needs treatment.  · Electrical currents will be sent from the catheter to ablate heart tissue in desired areas. There are three types of energy that may be used to ablate heart tissue:  ? Heat (radiofrequency energy).  ? Laser energy.  ? Extreme cold (cryoablation).  · When the necessary tissue has been ablated, the catheter will be removed.  · Pressure will be held on the  catheter insertion area to prevent excessive bleeding.  · A bandage (dressing) will be placed over the catheter insertion area.  The procedure may vary among health care providers and hospitals.  What happens after the procedure?  · Your blood pressure, heart rate, breathing rate, and blood oxygen level will be monitored until the medicines you were given have worn off.  · Your catheter insertion area will be monitored for bleeding. You will need to lie still for a few hours to ensure that you do not bleed from the catheter insertion area.  · Do not drive for 24 hours or as long as directed by your health care provider.  Summary  · Cardiac ablation is a procedure to disable (ablate) a small amount of heart tissue in very specific places. Ablating some of the problem areas can improve the heart rhythm or return it to normal.  · During the procedure, electrical currents will be sent from the catheter to ablate heart tissue in desired areas.  This information is not intended to replace advice given to you by your health care provider. Make sure you discuss any questions you have with your health care provider.  Document Released: 05/06/2010 Document Revised: 06/10/2019 Document Reviewed: 11/06/2017  Elsevier Patient Education © 2020 [employer]

## 2021-12-08 ENCOUNTER — OFFICE VISIT (OUTPATIENT)
Dept: CARDIOLOGY | Facility: MEDICAL CENTER | Age: 66
End: 2021-12-08
Payer: MEDICARE

## 2021-12-08 VITALS
WEIGHT: 227 LBS | BODY MASS INDEX: 38.76 KG/M2 | OXYGEN SATURATION: 98 % | DIASTOLIC BLOOD PRESSURE: 80 MMHG | HEIGHT: 64 IN | SYSTOLIC BLOOD PRESSURE: 132 MMHG | RESPIRATION RATE: 18 BRPM | HEART RATE: 60 BPM

## 2021-12-08 DIAGNOSIS — I48.92 ATRIAL FLUTTER, UNSPECIFIED TYPE (HCC): ICD-10-CM

## 2021-12-08 PROCEDURE — 99204 OFFICE O/P NEW MOD 45 MIN: CPT | Performed by: INTERNAL MEDICINE

## 2021-12-08 ASSESSMENT — FIBROSIS 4 INDEX: FIB4 SCORE: 1.07

## 2021-12-09 NOTE — PROGRESS NOTES
Arrhythmia Clinic Note (New patient)     DOS: 12/8/2021    Referring physician: Dr Gomes    Chief complaint/Reason for consult: AFL    HPI: 67 y/o F with h/o AFL s/p DCCV in 2020, on OAC since. No recurrence. No complaints.    ROS (+ highlighted in bold):  Constitutional: Fevers/chills/fatigue/weightloss  HEENT: Blurry vision/eye pain/sore throat/hearing loss  Respiratory: Shortness of breath/cough  Cardiovascular: Chest pain/palpitations/edema/orthopnea/syncope  GI: Nausea/vomitting/diarrhea  MSK: Arthralgias/myagias/muscle weakness  Skin: Rash/sores  Neurological: Numbness/tremors/vertigo  Endocrine: Excessive thirst/polyuria/cold intolerance/heat intolerance  Psych: Depression/anxiety    Past Medical History:   Diagnosis Date   • Apnea    • Atherosclerosis of aorta (HCC)    • Atrial fibrillation and flutter (HCC)    • Bipolar 1 disorder (HCC)    • GERD (gastroesophageal reflux disease)    • Hx of colonic polyp 8/5/2013    Formatting of this note might be different from the original. Had colonoscopy in 2013; 3 polyps removed in the colon [place] 2015, recommend repeat in 2020   • Pain     myofacial pain syndrome   • Prurigo nodularis    • Reactive airway disease        History reviewed. No pertinent surgical history.    Social History     Socioeconomic History   • Marital status:      Spouse name: Not on file   • Number of children: Not on file   • Years of education: Not on file   • Highest education level: Not on file   Occupational History   • Not on file   Tobacco Use   • Smoking status: Former Smoker   • Smokeless tobacco: Never Used   • Tobacco comment: 40 years ago   Vaping Use   • Vaping Use: Never used   Substance and Sexual Activity   • Alcohol use: Not Currently     Comment: recovering alcoholic, sober for 8 years   • Drug use: Not Currently   • Sexual activity: Not Currently   Other Topics Concern   • Not on file   Social History Narrative   • Not on file     Social Determinants of Health      Financial Resource Strain:    • Difficulty of Paying Living Expenses: Not on file   Food Insecurity:    • Worried About Running Out of Food in the Last Year: Not on file   • Ran Out of Food in the Last Year: Not on file   Transportation Needs:    • Lack of Transportation (Medical): Not on file   • Lack of Transportation (Non-Medical): Not on file   Physical Activity:    • Days of Exercise per Week: Not on file   • Minutes of Exercise per Session: Not on file   Stress:    • Feeling of Stress : Not on file   Social Connections:    • Frequency of Communication with Friends and Family: Not on file   • Frequency of Social Gatherings with Friends and Family: Not on file   • Attends Mandaeism Services: Not on file   • Active Member of Clubs or Organizations: Not on file   • Attends Club or Organization Meetings: Not on file   • Marital Status: Not on file   Intimate Partner Violence:    • Fear of Current or Ex-Partner: Not on file   • Emotionally Abused: Not on file   • Physically Abused: Not on file   • Sexually Abused: Not on file   Housing Stability:    • Unable to Pay for Housing in the Last Year: Not on file   • Number of Places Lived in the Last Year: Not on file   • Unstable Housing in the Last Year: Not on file       Family History   Problem Relation Age of Onset   • Stroke Mother    • Heart Disease Father        No Known Allergies    Current Outpatient Medications   Medication Sig Dispense Refill   • Lurasidone HCl (LATUDA) 60 MG Tab Take 60 mg by mouth with dinner. 30 Tablet 2   • pantoprazole (PROTONIX) 20 MG tablet Take 20 mg by mouth every day.     • Empagliflozin (JARDIANCE) 10 MG Tab Take 10 mg by mouth every day. 90 Tablet 1   • dupilumab (DUPIXENT) 300 MG/2ML Solution Prefilled Syringe injection Inject 1 Syringe under the skin every 14 days. 4 mL 11   • apixaban (ELIQUIS) 5mg Tab Take 5 mg by mouth 2 times a day.     • traZODone (DESYREL) 50 MG Tab Take 1 tablet by mouth at bedtime as needed for Sleep  "(as needed for sleep). Take 1/2 to 1 tablet by mouth at bedtime. 60 tablet 1     No current facility-administered medications for this visit.       Physical Exam:  Vitals:    12/08/21 1621   BP: 132/80   BP Location: Left arm   Patient Position: Sitting   BP Cuff Size: Adult   Pulse: 60   Resp: 18   SpO2: 98%   Weight: 103 kg (227 lb)   Height: 1.626 m (5' 4\")     General appearance: NAD, conversant   Eyes: anicteric sclerae, moist conjunctivae; no lid-lag; PERRLA  HENT: Atraumatic; oropharynx clear with moist mucous membranes and no mucosal ulcerations; normal hard and soft palate  Neck: Trachea midline; FROM, supple, no thyromegaly or lymphadenopathy  Lungs: CTA, with normal respiratory effort and no intercostal retractions  CV: RRR, no MRGs, no JVD   Abdomen: Soft, non-tender; no masses or HSM  Extremities: No peripheral edema or extremity lymphadenopathy  Skin: Normal temperature, turgor and texture; no rash, ulcers or subcutaneous nodules  Psych: Appropriate affect, alert and oriented to person, place and time    Data:  Lipids:   Lab Results   Component Value Date/Time    CHOLSTRLTOT 163 09/24/2021 06:44 AM    TRIGLYCERIDE 172 (H) 09/24/2021 06:44 AM    HDL 56 09/24/2021 06:44 AM    LDL 73 09/24/2021 06:44 AM        BMP:  Lab Results   Component Value Date/Time    SODIUM 141 09/24/2021 0644    POTASSIUM 4.3 09/24/2021 0644    CHLORIDE 103 09/24/2021 0644    CO2 26 09/24/2021 0644    GLUCOSE 114 (H) 09/24/2021 0644    BUN 12 09/24/2021 0644    CREATININE 0.87 09/24/2021 0644    CALCIUM 9.5 09/24/2021 0644    ANION 12.0 09/24/2021 0644        TSH:   Lab Results   Component Value Date/Time    TSHULTRASEN 2.100 09/24/2021 0644        THYROXINE (T4):   No results found for: GALINA     CBC:   Lab Results   Component Value Date/Time    WBC 6.3 09/24/2021 06:44 AM    RBC 4.57 09/24/2021 06:44 AM    HEMOGLOBIN 13.3 09/24/2021 06:44 AM    HEMATOCRIT 40.1 09/24/2021 06:44 AM    MCV 87.7 09/24/2021 06:44 AM    MCH 29.1 " 09/24/2021 06:44 AM    MCHC 33.2 (L) 09/24/2021 06:44 AM    RDW 40.6 09/24/2021 06:44 AM    PLATELETCT 236 09/24/2021 06:44 AM    MPV 9.5 09/24/2021 06:44 AM    NEUTSPOLYS 71.60 09/24/2021 06:44 AM    LYMPHOCYTES 19.70 (L) 09/24/2021 06:44 AM    MONOCYTES 5.20 09/24/2021 06:44 AM    EOSINOPHILS 2.40 09/24/2021 06:44 AM    BASOPHILS 0.60 09/24/2021 06:44 AM    IMMGRAN 0.50 09/24/2021 06:44 AM    NRBC 0.00 09/24/2021 06:44 AM    NEUTS 4.51 09/24/2021 06:44 AM    LYMPHS 1.24 09/24/2021 06:44 AM    MONOS 0.33 09/24/2021 06:44 AM    EOS 0.15 09/24/2021 06:44 AM    BASO 0.04 09/24/2021 06:44 AM    IMMGRANAB 0.03 09/24/2021 06:44 AM    NRBCAB 0.00 09/24/2021 06:44 AM        CBC w/o DIFF  Lab Results   Component Value Date/Time    WBC 6.3 09/24/2021 06:44 AM    RBC 4.57 09/24/2021 06:44 AM    HEMOGLOBIN 13.3 09/24/2021 06:44 AM    MCV 87.7 09/24/2021 06:44 AM    MCH 29.1 09/24/2021 06:44 AM    MCHC 33.2 (L) 09/24/2021 06:44 AM    RDW 40.6 09/24/2021 06:44 AM    MPV 9.5 09/24/2021 06:44 AM       Prior DENY showed no thrombus per report    EKG interpreted by me: NSR    EKG from 2020 interpreted by me: AFL typical vs atypical    Impression/Plan:  1. Atrial flutter, unspecified type (HCC)  EKG     1. AFL s/p DCCV  2. OAC management, Eliquis    - Continue Eliquis, [id number] is 3  - Discussed RFA for curative tx of flutter and stopping OAC. Not a good time in her life right now for this she says. She will call back if she reconsiders otherwise I will check in with her at 6 months      Se Lozano MD  Cardiac Electrophysiology

## 2021-12-11 LAB — EKG IMPRESSION: NORMAL

## 2021-12-11 PROCEDURE — 93000 ELECTROCARDIOGRAM COMPLETE: CPT | Performed by: INTERNAL MEDICINE

## 2021-12-23 ENCOUNTER — APPOINTMENT (OUTPATIENT)
Dept: RADIOLOGY | Facility: MEDICAL CENTER | Age: 66
End: 2021-12-23
Attending: PHYSICIAN ASSISTANT
Payer: MEDICARE

## 2021-12-30 ENCOUNTER — APPOINTMENT (OUTPATIENT)
Dept: MEDICAL GROUP | Facility: IMAGING CENTER | Age: 66
End: 2021-12-30
Payer: COMMERCIAL

## 2022-01-04 ENCOUNTER — OFFICE VISIT (OUTPATIENT)
Dept: MEDICAL GROUP | Facility: IMAGING CENTER | Age: 67
End: 2022-01-04
Payer: COMMERCIAL

## 2022-01-04 VITALS
SYSTOLIC BLOOD PRESSURE: 134 MMHG | HEART RATE: 62 BPM | OXYGEN SATURATION: 99 % | BODY MASS INDEX: 38.93 KG/M2 | RESPIRATION RATE: 12 BRPM | DIASTOLIC BLOOD PRESSURE: 84 MMHG | TEMPERATURE: 99 F | HEIGHT: 64 IN | WEIGHT: 228 LBS

## 2022-01-04 DIAGNOSIS — M54.50 ACUTE RIGHT-SIDED LOW BACK PAIN WITHOUT SCIATICA: ICD-10-CM

## 2022-01-04 PROCEDURE — 99213 OFFICE O/P EST LOW 20 MIN: CPT | Performed by: CLINICAL NURSE SPECIALIST

## 2022-01-04 RX ORDER — METOPROLOL SUCCINATE 25 MG/1
TABLET, EXTENDED RELEASE ORAL
COMMUNITY
Start: 2021-12-29 | End: 2022-03-01 | Stop reason: SDUPTHER

## 2022-01-04 RX ORDER — LURASIDONE HYDROCHLORIDE 40 MG/1
20 TABLET, FILM COATED ORAL
COMMUNITY
Start: 2021-12-24 | End: 2022-03-01

## 2022-01-04 RX ORDER — IBUPROFEN 200 MG
200 TABLET ORAL EVERY 6 HOURS PRN
COMMUNITY
End: 2022-03-01

## 2022-01-04 RX ORDER — TIZANIDINE 4 MG/1
4 TABLET ORAL 2 TIMES DAILY PRN
Qty: 28 TABLET | Refills: 0 | Status: SHIPPED | OUTPATIENT
Start: 2022-01-04 | End: 2022-03-01

## 2022-01-04 ASSESSMENT — PATIENT HEALTH QUESTIONNAIRE - PHQ9: CLINICAL INTERPRETATION OF PHQ2 SCORE: 0

## 2022-01-04 ASSESSMENT — PAIN SCALES - GENERAL: PAINLEVEL: 8=MODERATE-SEVERE PAIN

## 2022-01-04 ASSESSMENT — FIBROSIS 4 INDEX: FIB4 SCORE: 1.07

## 2022-01-04 NOTE — PATIENT INSTRUCTIONS
Tizanidine tablets or capsules  What is this medicine?  TIZANIDINE (jamal mcgregor) helps to relieve muscle spasms. It may be used to help in the treatment of multiple sclerosis and spinal cord injury.  This medicine may be used for other purposes; ask your health care provider or pharmacist if you have questions.  COMMON BRAND NAME(S): Zanaflex  What should I tell my health care provider before I take this medicine?  They need to know if you have any of these conditions:  · kidney disease  · liver disease  · low blood pressure  · mental disorder  · an unusual or allergic reaction to tizanidine, other medicines, lactose (tablets only), foods, dyes, or preservatives  · pregnant or trying to get pregnant  · breast-feeding  How should I use this medicine?  Take this medicine by mouth with a full glass of water. Take this medicine on an empty stomach, at least 30 minutes before or 2 hours after food. Do not take with food unless you talk with your doctor. Follow the directions on the prescription label. Take your medicine at regular intervals. Do not take your medicine more often than directed. Do not stop taking except on your doctor's advice. Suddenly stopping the medicine can be very dangerous.  Talk to your pediatrician regarding the use of this medicine in children.  Patients over 65 years old may have a stronger reaction and need a smaller dose.  Overdosage: If you think you have taken too much of this medicine contact a poison control center or emergency room at once.  NOTE: This medicine is only for you. Do not share this medicine with others.  What if I miss a dose?  If you miss a dose, take it as soon as you can. If it is almost time for your next dose, take only that dose. Do not take double or extra doses.  What may interact with this medicine?  Do not take this medicine with any of the following medications:  · ciprofloxacin  · fluvoxamine  · narcotic medicines for cough  · thiabendazole  This medicine may  also interact with the following medications:  · acyclovir  · alcohol  · antihistamines for allergy, cough, and cold  · baclofen  · certain medicines for anxiety or sleep  · certain medicines for blood pressure, heart disease, irregular heartbeat  · certain medicines for depression like amitriptyline, fluoxetine, sertraline  · certain medicines for seizures like phenobarbital, primidone  · certain medicines for stomach problems like cimetidine, famotidine  · female hormones, like estrogens or progestins and birth control pills, patches, rings, or injections  · general anesthetics like halothane, isoflurane, methoxyflurane, propofol  · local anesthetics like lidocaine, pramoxine, tetracaine  · medicines that relax muscles for surgery  · narcotic medicines for pain  · phenothiazines like chlorpromazine, mesoridazine, prochlorperazine  · ticlopidine  · zileuton  This list may not describe all possible interactions. Give your health care provider a list of all the medicines, herbs, non-prescription drugs, or dietary supplements you use. Also tell them if you smoke, drink alcohol, or use illegal drugs. Some items may interact with your medicine.  What should I watch for while using this medicine?  Tell your doctor or health care professional if your symptoms do not start to get better or if they get worse.  You may get drowsy or dizzy. Do not drive, use machinery, or do anything that needs mental alertness until you know how this medicine affects you. Do not stand or sit up quickly, especially if you are an older patient. This reduces the risk of dizzy or fainting spells. Alcohol may interfere with the effect of this medicine. Avoid alcoholic drinks.  If you are taking another medicine that also causes drowsiness, you may have more side effects. Give your health care provider a list of all medicines you use. Your doctor will tell you how much medicine to take. Do not take more medicine than directed. Call emergency for  help if you have problems breathing or unusual sleepiness.  Your mouth may get dry. Chewing sugarless gum or sucking hard candy, and drinking plenty of water may help. Contact your doctor if the problem does not go away or is severe.  What side effects may I notice from receiving this medicine?  Side effects that you should report to your doctor or health care professional as soon as possible:  · allergic reactions like skin rash, itching or hives, swelling of the face, lips, or tongue  · breathing problems  · hallucinations  · signs and symptoms of liver injury like dark yellow or brown urine; general ill feeling or flu-like symptoms; light-colored stools; loss of appetite; nausea; right upper quadrant belly pain; unusually weak or tired; yellowing of the eyes or skin  · signs and symptoms of low blood pressure like dizziness; feeling faint or lightheaded, falls; unusually weak or tired  · unusually slow heartbeat  · unusually weak or tired  Side effects that usually do not require medical attention (report to your doctor or health care professional if they continue or are bothersome):  · blurred vision  · constipation  · dizziness  · dry mouth  · tiredness  This list may not describe all possible side effects. Call your doctor for medical advice about side effects. You may report side effects to FDA at 2-145-FDA-5197.  Where should I keep my medicine?  Keep out of the reach of children.  Store at room temperature between 15 and 30 degrees C (59 and 86 degrees F). Throw away any unused medicine after the expiration date.  NOTE: This sheet is a summary. It may not cover all possible information. If you have questions about this medicine, talk to your doctor, pharmacist, or health care provider.  © 2020 [employer]vier/Gold Standard (2018-10-02 13:33:29)

## 2022-01-04 NOTE — ASSESSMENT & PLAN NOTE
Slipped but did not fall and pain began 4 days ago.  Pain in right gluteal area and on lumbar spine.  She could not move for 2-3 days. Used hot pack and took Advil which helped slightly.  Pain today 8/10, throbbing with occasional sharp pain, radiating down front of leg.  Forward bending and sitting worsens.  She has known lumbar degeneration. Last Advil dose 6AM. No incontinence, saddle anesthesia, weakness.

## 2022-01-04 NOTE — PROGRESS NOTES
"Subjective     Akua Winter is a 66 y.o. female who presents with Back Pain (started 1/1/21)            HPI  Acute right-sided low back pain without sciatica  Slipped but did not fall and pain began 4 days ago.  Pain in right gluteal area and on lumbar spine.  She could not move for 2-3 days. Used hot pack and took Advil which helped slightly.  Pain today 8/10, throbbing with occasional sharp pain, radiating down front of leg.  Forward bending and sitting worsens.  She has known lumbar degeneration. Last Advil dose 6AM. No incontinence, saddle anesthesia, weakness.        ROS  See HPI    No Known Allergies    Current Outpatient Medications on File Prior to Visit   Medication Sig Dispense Refill   • metoprolol SR (TOPROL XL) 25 MG TABLET SR 24 HR      • LATUDA 40 MG Tab      • ibuprofen (MOTRIN) 200 MG Tab Take 200 mg by mouth every 6 hours as needed.     • atorvastatin (LIPITOR) 20 MG Tab TAKE 1 TABLET BY MOUTH EVERY DAY 90 Tablet 3   • pantoprazole (PROTONIX) 20 MG tablet TAKE 1 TABLET BY MOUTH 2 TIMES A  Tablet 3   • Empagliflozin (JARDIANCE) 10 MG Tab Take 10 mg by mouth every day. 90 Tablet 1   • dupilumab (DUPIXENT) 300 MG/2ML Solution Prefilled Syringe injection Inject 1 Syringe under the skin every 14 days. 4 mL 11   • apixaban (ELIQUIS) 5mg Tab Take 5 mg by mouth 2 times a day.     • traZODone (DESYREL) 50 MG Tab Take 1 tablet by mouth at bedtime as needed for Sleep (as needed for sleep). Take 1/2 to 1 tablet by mouth at bedtime. 60 tablet 1   • Lurasidone HCl (LATUDA) 60 MG Tab Take 60 mg by mouth with dinner. (Patient not taking: Reported on 1/4/2022) 30 Tablet 2     No current facility-administered medications on file prior to visit.           Objective     /84   Pulse 62   Temp 37.2 °C (99 °F)   Resp 12   Ht 1.626 m (5' 4\")   Wt 103 kg (228 lb)   LMP  (LMP Unknown)   SpO2 99%   BMI 39.14 kg/m²      Physical Exam  Constitutional:       General: She is not in acute distress.     " Appearance: Normal appearance. She is not ill-appearing, toxic-appearing or diaphoretic.   HENT:      Head: Normocephalic and atraumatic.   Eyes:      Pupils: Pupils are equal, round, and reactive to light.   Pulmonary:      Effort: Pulmonary effort is normal.   Musculoskeletal:         General: No swelling or tenderness.      Comments: Straight leg test positive on right   Skin:     General: Skin is warm and dry.   Neurological:      Mental Status: She is alert and oriented to person, place, and time.      Gait: Gait normal.   Psychiatric:         Mood and Affect: Mood normal.         Behavior: Behavior normal.         Thought Content: Thought content normal.         Judgment: Judgment normal.           10/6/21 CT  The bony structures are unremarkable except for minor degenerative marginal spurring and facet arthropathy in the lumbar spine.              Assessment & Plan        1. Acute right-sided low back pain without sciatica  Akua likely has a herniated disc as she had a positive straight leg test.  We will start with conservative treatment.  She may take Aleve and Tylenol 1000mg as needed up to 2 times daily for pain. Monitor closely for signs of bleeding as she is on Eliquis and stop Aleve if unusual bleeding occurs.  Tizanidine as needed provided with instruction to avoid driving while taking.  May use heat/cold as tolerated. Walk as tolerated. No heavy lifting or repetitive twisting motions while healing.   Report any saddle anesthesia, weakness in LE, incontinence, worsening of symptoms.   - tizanidine (ZANAFLEX) 4 MG Tab; Take 1 Tablet by mouth 2 times a day as needed.  Dispense: 28 Tablet; Refill: 0

## 2022-01-04 NOTE — LETTER
[facility]  [facility]  [address]  [place] [address]     January 4, 2022    Patient: Akua Winter   YOB: 1955   Date of Visit: 1/4/2022       To Whom It May Concern:    Akua Winter was seen and treated in our department on 1/4/2022.  She is unable to perform activities that require repeated bending or twisting. She is unable to lift greater than 5 pounds of weight.    Sincerely,     WINSTON Srinivasan.

## 2022-01-05 ENCOUNTER — TELEPHONE (OUTPATIENT)
Dept: MEDICAL GROUP | Facility: IMAGING CENTER | Age: 67
End: 2022-01-05
Payer: COMMERCIAL

## 2022-01-05 NOTE — TELEPHONE ENCOUNTER
Received message from Griselda from [facility]. She would like to verify that we have written a letter she received from the patient.    Called and tono.

## 2022-01-07 DIAGNOSIS — L28.1 PRURIGO NODULARIS: ICD-10-CM

## 2022-01-10 RX ORDER — DUPILUMAB 300 MG/2ML
INJECTION, SOLUTION SUBCUTANEOUS
Qty: 4 ML | Refills: 11 | Status: SHIPPED | OUTPATIENT
Start: 2022-01-10

## 2022-02-24 RX ORDER — PANTOPRAZOLE SODIUM 20 MG/1
TABLET, DELAYED RELEASE ORAL
Qty: 180 TABLET | Refills: 3 | Status: SHIPPED | OUTPATIENT
Start: 2022-02-24 | End: 2022-03-01 | Stop reason: SDUPTHER

## 2022-03-01 ENCOUNTER — OFFICE VISIT (OUTPATIENT)
Dept: MEDICAL GROUP | Facility: IMAGING CENTER | Age: 67
End: 2022-03-01
Payer: COMMERCIAL

## 2022-03-01 VITALS
BODY MASS INDEX: 38.58 KG/M2 | RESPIRATION RATE: 16 BRPM | HEART RATE: 74 BPM | HEIGHT: 64 IN | WEIGHT: 226 LBS | TEMPERATURE: 96.9 F | SYSTOLIC BLOOD PRESSURE: 124 MMHG | OXYGEN SATURATION: 96 % | DIASTOLIC BLOOD PRESSURE: 70 MMHG

## 2022-03-01 DIAGNOSIS — I70.0 ATHEROSCLEROSIS OF AORTA (HCC): ICD-10-CM

## 2022-03-01 DIAGNOSIS — R73.9 HYPERGLYCEMIA: ICD-10-CM

## 2022-03-01 DIAGNOSIS — R73.02 IGT (IMPAIRED GLUCOSE TOLERANCE): ICD-10-CM

## 2022-03-01 DIAGNOSIS — Z12.31 ENCOUNTER FOR SCREENING MAMMOGRAM FOR BREAST CANCER: ICD-10-CM

## 2022-03-01 DIAGNOSIS — F10.21 ALCOHOL DEPENDENCE IN REMISSION (HCC): ICD-10-CM

## 2022-03-01 DIAGNOSIS — F31.9 BIPOLAR 1 DISORDER (HCC): ICD-10-CM

## 2022-03-01 DIAGNOSIS — I10 HYPERTENSION, UNSPECIFIED TYPE: ICD-10-CM

## 2022-03-01 DIAGNOSIS — K21.9 GASTROESOPHAGEAL REFLUX DISEASE WITHOUT ESOPHAGITIS: ICD-10-CM

## 2022-03-01 DIAGNOSIS — Z23 NEED FOR VACCINATION: ICD-10-CM

## 2022-03-01 DIAGNOSIS — L28.1 PRURIGO NODULARIS: ICD-10-CM

## 2022-03-01 DIAGNOSIS — E78.2 MIXED HYPERLIPIDEMIA: ICD-10-CM

## 2022-03-01 DIAGNOSIS — I48.92 ATRIAL FLUTTER, UNSPECIFIED TYPE (HCC): ICD-10-CM

## 2022-03-01 DIAGNOSIS — Z00.00 WELLNESS EXAMINATION: ICD-10-CM

## 2022-03-01 DIAGNOSIS — Z76.0 MEDICATION REFILL: ICD-10-CM

## 2022-03-01 PROBLEM — M79.89 LEG SWELLING: Status: RESOLVED | Noted: 2021-06-29 | Resolved: 2022-03-01

## 2022-03-01 PROBLEM — R14.0 ABDOMINAL BLOATING: Status: RESOLVED | Noted: 2021-06-29 | Resolved: 2022-03-01

## 2022-03-01 PROBLEM — R10.12 LEFT UPPER QUADRANT ABDOMINAL PAIN: Status: RESOLVED | Noted: 2021-09-29 | Resolved: 2022-03-01

## 2022-03-01 PROBLEM — F31.32 BIPOLAR AFFECTIVE DISORDER, CURRENTLY DEPRESSED, MODERATE (HCC): Status: RESOLVED | Noted: 2021-01-27 | Resolved: 2022-03-01

## 2022-03-01 PROBLEM — M54.50 ACUTE RIGHT-SIDED LOW BACK PAIN WITHOUT SCIATICA: Status: RESOLVED | Noted: 2022-01-04 | Resolved: 2022-03-01

## 2022-03-01 PROBLEM — I48.91 ATRIAL FIBRILLATION (HCC): Status: RESOLVED | Noted: 2020-11-24 | Resolved: 2022-03-01

## 2022-03-01 PROBLEM — R19.7 DIARRHEA: Status: RESOLVED | Noted: 2021-06-29 | Resolved: 2022-03-01

## 2022-03-01 PROBLEM — R73.03 PREDIABETES: Status: RESOLVED | Noted: 2021-12-03 | Resolved: 2022-03-01

## 2022-03-01 PROBLEM — R11.0 NAUSEA: Status: RESOLVED | Noted: 2021-06-29 | Resolved: 2022-03-01

## 2022-03-01 PROBLEM — F41.1 GENERALIZED ANXIETY DISORDER: Status: RESOLVED | Noted: 2021-01-27 | Resolved: 2022-03-01

## 2022-03-01 PROBLEM — L65.9 HAIR LOSS: Status: RESOLVED | Noted: 2021-03-26 | Resolved: 2022-03-01

## 2022-03-01 PROCEDURE — 90471 IMMUNIZATION ADMIN: CPT | Performed by: PHYSICIAN ASSISTANT

## 2022-03-01 PROCEDURE — 90732 PPSV23 VACC 2 YRS+ SUBQ/IM: CPT | Performed by: PHYSICIAN ASSISTANT

## 2022-03-01 PROCEDURE — 90472 IMMUNIZATION ADMIN EACH ADD: CPT | Performed by: PHYSICIAN ASSISTANT

## 2022-03-01 PROCEDURE — 99397 PER PM REEVAL EST PAT 65+ YR: CPT | Mod: 25 | Performed by: PHYSICIAN ASSISTANT

## 2022-03-01 PROCEDURE — 90715 TDAP VACCINE 7 YRS/> IM: CPT | Performed by: PHYSICIAN ASSISTANT

## 2022-03-01 RX ORDER — METOPROLOL SUCCINATE 25 MG/1
25 TABLET, EXTENDED RELEASE ORAL DAILY
Qty: 90 TABLET | Refills: 1 | Status: SHIPPED | OUTPATIENT
Start: 2022-03-01 | End: 2022-10-18

## 2022-03-01 RX ORDER — EMPAGLIFLOZIN 10 MG/1
10 TABLET, FILM COATED ORAL DAILY
Qty: 90 TABLET | Refills: 1 | Status: SHIPPED | OUTPATIENT
Start: 2022-03-01

## 2022-03-01 RX ORDER — PANTOPRAZOLE SODIUM 20 MG/1
20 TABLET, DELAYED RELEASE ORAL 2 TIMES DAILY
Qty: 180 TABLET | Refills: 3 | Status: SHIPPED | OUTPATIENT
Start: 2022-03-01

## 2022-03-01 RX ORDER — ATORVASTATIN CALCIUM 20 MG/1
20 TABLET, FILM COATED ORAL
Qty: 90 TABLET | Refills: 3 | Status: SHIPPED | OUTPATIENT
Start: 2022-03-01 | End: 2022-03-03 | Stop reason: SDUPTHER

## 2022-03-01 RX ORDER — LURASIDONE HYDROCHLORIDE 20 MG/1
20 TABLET, FILM COATED ORAL
Qty: 90 TABLET | Refills: 1 | Status: SHIPPED | OUTPATIENT
Start: 2022-03-01 | End: 2022-03-04

## 2022-03-01 ASSESSMENT — FIBROSIS 4 INDEX: FIB4 SCORE: 1.07

## 2022-03-01 ASSESSMENT — PAIN SCALES - GENERAL: PAINLEVEL: NO PAIN

## 2022-03-01 NOTE — ASSESSMENT & PLAN NOTE
Chronic, currently controlled on Latuda 20 mg daily.  Previously on Lexapro.  Has done well since being off of Lexapro.

## 2022-03-01 NOTE — PATIENT INSTRUCTIONS
It was a pleasure meeting with you today at [facility]!    Your medical history/records and medications were reviewed today.     If you have any prescription refill requests, please send them via Tapad or discuss with your provider at the start of your office visits. Please allow 3-5 business days for lab and testing review and you will be contacted via Tapad with those results, or if advised to make a follow up appointment regarding those results, then please do so.     Once resulted, your lab/test/imaging results will show up automatically in your MyChart. Please wait for my interpretation and recommendations prior to viewing your results to avoid any unnecessary confusion or misinterpretation. I will address all of the lab values that I interpret as abnormal and message you accordingly on your MyChart. I will always send you a message about your results even if they are normal. If you do not hear back from me within 5-7 business days after completing your tests, then please send me a message on [employer] so I can obtain your results (especially if you went to an outside lab or imaging center - [facility], [facility], etc).     If you have any additional questions or concerns beyond my interpretation of your results, please make an appointment with me to discuss in further detail.    Please only use the Tapad messaging system for questions regarding your most recent appointment or if advised to use otherwise (glucose or blood pressure reporting).     If you have any new problems or concerns, you must make an appointment to discuss. This includes any referral requests, lab requests (unless advised to notify me for pre-appt labs), medication side effects, or request for medication adjustments.       Thank you,    Alejandra Frederick PA-C ([place])  Physician Assistant Certified  [facility]

## 2022-03-01 NOTE — PROGRESS NOTES
Subjective:     CC:   Chief Complaint   Patient presents with   • Medication Problem     Latuda- needs changed    • Medication Refill       HPI:   Akua presents today to discuss:    Alcohol dependence in remission (HCC)  Previous history of alcohol abuse.  Patient states that she has been sober for over 10 years.    Atherosclerosis of aorta (HCC)  Noted on previous imaging, on Eliquis and statin.    Atrial flutter (HCC)  Managed by cardiology.  On Eliquis metoprolol.  Patient has refused ablation in the past.    Bipolar 1 disorder (HCC)  Chronic, currently controlled on Latuda 20 mg daily.  Previously on Lexapro.  Has done well since being off of Lexapro.    Gastroesophageal reflux disease without esophagitis  Chronic, controlled on Protonix 20 mg twice a day.    Hypertension  Chronic, controlled on metoprolol 25 mg daily.    IGT (impaired glucose tolerance)  Chronic, on Jardiance 10 mg daily.  No side effects.  Patient refusing labs today.    Mixed hyperlipidemia  Chronic, controlled on Lipitor 20 mg daily.  No side effects.    Prurigo nodularis  Chronic, managed on Dupixent.      Past Medical History:   Diagnosis Date   • Apnea    • Atherosclerosis of aorta (HCC)    • Atrial fibrillation and flutter (HCC)    • Bipolar 1 disorder (HCC)    • Bipolar affective disorder, currently depressed, moderate (HCC) 1/27/2021   • Generalized anxiety disorder 1/27/2021   • GERD (gastroesophageal reflux disease)    • Hx of colonic polyp 8/5/2013    Formatting of this note might be different from the original. Had colonoscopy in 2013; 3 polyps removed in the colon [place] 2015, recommend repeat in 2020   • Pain     myofacial pain syndrome   • Prurigo nodularis    • Reactive airway disease      Social History     Tobacco Use   • Smoking status: Former Smoker   • Smokeless tobacco: Never Used   • Tobacco comment: 40 years ago   Vaping Use   • Vaping Use: Never used   Substance Use Topics   • Alcohol use: Not Currently     Comment:  "recovering alcoholic, sober for 8 years   • Drug use: Not Currently     Current Outpatient Medications Ordered in Epic   Medication Sig Dispense Refill   • lurasidone (LATUDA) 20 MG Tab Take 1 Tablet by mouth with dinner. 90 Tablet 1   • metoprolol SR (TOPROL XL) 25 MG TABLET SR 24 HR Take 1 Tablet by mouth every day. 90 Tablet 1   • apixaban (ELIQUIS) 5mg Tab Take 1 Tablet by mouth 2 times a day for 90 days. 180 Tablet 1   • atorvastatin (LIPITOR) 20 MG Tab Take 1 Tablet by mouth every day. 90 Tablet 3   • Empagliflozin (JARDIANCE) 10 MG Tab Take 10 mg by mouth every day. 90 Tablet 1   • pantoprazole (PROTONIX) 20 MG tablet Take 1 Tablet by mouth 2 times a day. 180 Tablet 3   • DUPIXENT 300 MG/2ML Solution Prefilled Syringe injection INJECT 300MG SUBCUTANEOUSLY EVERY OTHER WEEK 4 mL 11     No current [facility]-ordered facility-administered medications on file.     Patient has no known allergies.    Health Maintenance: Completed, Tdap Pneumovax today    ROS: see hpi  Gen: no fevers/chills  Pulm: no sob, no cough  CV: no chest pain, no palpitations, no edema  GI: no nausea/vomiting, no diarrhea  Skin: no rash    Objective:   Exam:  /70   Pulse 74   Temp 36.1 °C (96.9 °F)   Resp 16   Ht 1.626 m (5' 4\")   Wt 103 kg (226 lb)   LMP  (LMP Unknown)   SpO2 96%   BMI 38.79 kg/m²    Body mass index is 38.79 kg/m².    Gen: Alert and oriented, No apparent distress.  HEENT: Head atraumatic, normocephalic. Pupils equal and round.  Neck: Neck is supple without lymphadenopathy.   Lungs: Normal effort, CTA bilaterally, no wheezes, rhonchi, or rales  CV: Regular rate and rhythm. No murmurs, rubs, or gallops.  ABD: +BS. Non-tender, non-distended. No rebound, rigidity, or guarding.  Ext: No clubbing, cyanosis, edema.    Assessment & Plan:     66 y.o. female with the following -     1. Wellness examination  Pleasant 66-year-old female here for annual physical.  History reviewed.  Vaccinations discussed.  Tdap and Pneumovax " today.  Order for mammogram given.  Future Shingrix.    2. Hyperglycemia  With IGT, refusing labs today.  A1c next visit.  Refill Jardiance.  - Empagliflozin (JARDIANCE) 10 MG Tab; Take 10 mg by mouth every day.  Dispense: 90 Tablet; Refill: 1    3. IGT (impaired glucose tolerance)  - Empagliflozin (JARDIANCE) 10 MG Tab; Take 10 mg by mouth every day.  Dispense: 90 Tablet; Refill: 1    4. Bipolar 1 disorder (HCC)  Chronic, controlled on Latuda.  Continue current regimen.  - lurasidone (LATUDA) 20 MG Tab; Take 1 Tablet by mouth with dinner.  Dispense: 90 Tablet; Refill: 1    5. Alcohol dependence in remission (HCC)  Currently in remission, continue cessation.    6. Atrial flutter, unspecified type (HCC)  Chronic, on Eliquis and Toprol.  Continue current regimen.  Managed by cardiology.  - metoprolol SR (TOPROL XL) 25 MG TABLET SR 24 HR; Take 1 Tablet by mouth every day.  Dispense: 90 Tablet; Refill: 1  - apixaban (ELIQUIS) 5mg Tab; Take 1 Tablet by mouth 2 times a day for 90 days.  Dispense: 180 Tablet; Refill: 1    7. Atherosclerosis of aorta (HCC)  Chronic, on Eliquis and Lipitor.  Avoid tobacco and alcohol.  Continue lifestyle changes including balanced diet and increasing exercise.    8. Gastroesophageal reflux disease without esophagitis  Chronic, controlled on Protonix 20 mg twice a day.  - pantoprazole (PROTONIX) 20 MG tablet; Take 1 Tablet by mouth 2 times a day.  Dispense: 180 Tablet; Refill: 3    9. Mixed hyperlipidemia  Chronic, controlled on Lipitor 20 mg daily. Please continue working on lifestyle modifications. This includes accumulation of 150 minutes or more of moderate-intensity activity each week as tolerated and a healthy diet that is low in saturated fat, sodium, and cholesterol, such as the mediterranean diet that is typically high in fruits, vegetables, whole grains, beans, nuts, and seeds, includes olive oil as an important source of monounsaturated fat, DASH diet, and/or also consider a  plant-based diet.    - atorvastatin (LIPITOR) 20 MG Tab; Take 1 Tablet by mouth every day.  Dispense: 90 Tablet; Refill: 3    10. Hypertension, unspecified type  Chronic, controlled on metoprolol 25 mg daily.    11. Prurigo nodularis  Chronic, controlled on Dupixent.    12. Encounter for screening mammogram for breast cancer  - MA-SCREENING MAMMO BILAT W/TOMOSYNTHESIS W/CAD; Future    13. Need for vaccination  - Pneumovax Vaccine (PPSV23)  - Tdap Vaccine =>6YO IM    14. Medication refill    Return in about 6 months (around 9/1/2022) for Follow-up.    Alejandra Frederick PA-C ([place])  Physician Assistant Certified  [facility]    Please note that this dictation was created using voice recognition software. I have made every reasonable attempt to correct obvious errors, but I expect that there are errors of grammar and possibly content that I did not discover before finalizing the note.

## 2022-03-03 DIAGNOSIS — E78.2 MIXED HYPERLIPIDEMIA: ICD-10-CM

## 2022-03-03 RX ORDER — ATORVASTATIN CALCIUM 20 MG/1
20 TABLET, FILM COATED ORAL
Qty: 90 TABLET | Refills: 3 | Status: SHIPPED | OUTPATIENT
Start: 2022-03-03

## 2022-03-04 DIAGNOSIS — F31.9 BIPOLAR 1 DISORDER (HCC): ICD-10-CM

## 2022-03-04 RX ORDER — ARIPIPRAZOLE 5 MG/1
5 TABLET ORAL DAILY
Qty: 90 TABLET | Refills: 1 | Status: SHIPPED | OUTPATIENT
Start: 2022-03-04 | End: 2022-05-10

## 2022-04-15 ENCOUNTER — TELEPHONE (OUTPATIENT)
Dept: MEDICAL GROUP | Facility: IMAGING CENTER | Age: 67
End: 2022-04-15
Payer: COMMERCIAL

## 2022-04-15 NOTE — TELEPHONE ENCOUNTER
Received call from Karmen with [employer] regarding prior authorization for patients medication Dupixent. Was provided with phone number to call for [employer] to follow up with appeal process. Ph. [phone] opt 1 for [place]  Patient's id [id number].

## 2022-05-06 ENCOUNTER — TELEPHONE (OUTPATIENT)
Dept: MEDICAL GROUP | Facility: IMAGING CENTER | Age: 67
End: 2022-05-06

## 2022-05-06 NOTE — TELEPHONE ENCOUNTER
Received message from Umberto requesting peer to peer with Alejandra Frederick.   Call back number [phone].

## 2022-05-10 DIAGNOSIS — F31.9 BIPOLAR 1 DISORDER (HCC): ICD-10-CM

## 2022-05-10 DIAGNOSIS — L28.1 PRURIGO NODULARIS: ICD-10-CM

## 2022-05-10 RX ORDER — LURASIDONE HYDROCHLORIDE 20 MG/1
20 TABLET, FILM COATED ORAL
Qty: 90 TABLET | Refills: 1 | Status: SHIPPED | OUTPATIENT
Start: 2022-05-10

## 2022-05-10 NOTE — PROGRESS NOTES
Patient notified our office and states that she is not doing well with the Abilify.  She states that her bipolar symptoms are worsening and she is developing more depression symptoms.  She was previously well controlled on Latuda.  We will represcribe Latuda 20 mg daily and submit prior authorization if necessary.  Previously failed Lexapro, as this exacerbated her bipolar symptoms/jose.    Message also sent to my inbox regarding [id number] for Dupixent.  Attempted to call number back and left voicemail.    Alejandra Frederick PA-C ([place])  Physician Assistant Certified  [facility]

## 2022-06-20 ENCOUNTER — HOSPITAL ENCOUNTER (OUTPATIENT)
Dept: HOSPITAL 93 - MAMO-SONO | Age: 67
Discharge: HOME | End: 2022-06-20
Attending: GENERAL PRACTICE
Payer: COMMERCIAL

## 2022-06-20 DIAGNOSIS — M54.2: ICD-10-CM

## 2022-06-20 DIAGNOSIS — M54.9: ICD-10-CM

## 2022-06-20 DIAGNOSIS — N64.4: Primary | ICD-10-CM

## 2022-11-11 ENCOUNTER — PATIENT MESSAGE (OUTPATIENT)
Dept: HEALTH INFORMATION MANAGEMENT | Facility: OTHER | Age: 67
End: 2022-11-11

## 2023-03-14 ENCOUNTER — HOSPITAL ENCOUNTER (OUTPATIENT)
Dept: HOSPITAL 93 - SONOGRAMA | Age: 68
Discharge: HOME | End: 2023-03-14
Attending: GENERAL PRACTICE
Payer: COMMERCIAL

## 2023-03-14 DIAGNOSIS — R94.6: Primary | ICD-10-CM

## 2023-04-13 ENCOUNTER — HOSPITAL ENCOUNTER (OUTPATIENT)
Dept: HOSPITAL 93 - LAB | Age: 68
Discharge: HOME | End: 2023-04-13
Attending: RADIOLOGY
Payer: COMMERCIAL

## 2023-04-13 DIAGNOSIS — C54.1: Primary | ICD-10-CM

## 2023-04-14 ENCOUNTER — HOSPITAL ENCOUNTER (OUTPATIENT)
Dept: HOSPITAL 93 - MRI | Age: 68
Discharge: HOME | End: 2023-04-14
Attending: STUDENT IN AN ORGANIZED HEALTH CARE EDUCATION/TRAINING PROGRAM
Payer: COMMERCIAL

## 2023-04-14 DIAGNOSIS — C54.1: Primary | ICD-10-CM

## 2023-04-14 PROCEDURE — 72196 MRI PELVIS W/DYE: CPT

## 2023-05-19 ENCOUNTER — HOSPITAL ENCOUNTER (OUTPATIENT)
Dept: HOSPITAL 93 - NUCLEAR | Age: 68
Discharge: HOME | End: 2023-05-19
Attending: STUDENT IN AN ORGANIZED HEALTH CARE EDUCATION/TRAINING PROGRAM
Payer: COMMERCIAL

## 2023-05-19 ENCOUNTER — HOSPITAL ENCOUNTER (OUTPATIENT)
Dept: HOSPITAL 93 - RAD | Age: 68
Discharge: HOME | End: 2023-05-19
Attending: OBSTETRICS & GYNECOLOGY
Payer: COMMERCIAL

## 2023-05-19 DIAGNOSIS — R07.9: ICD-10-CM

## 2023-05-19 DIAGNOSIS — R79.1: ICD-10-CM

## 2023-05-19 DIAGNOSIS — I25.118: Primary | ICD-10-CM

## 2023-05-19 DIAGNOSIS — N39.0: ICD-10-CM

## 2023-05-19 DIAGNOSIS — D64.9: ICD-10-CM

## 2023-05-19 DIAGNOSIS — C54.1: ICD-10-CM

## 2023-05-19 DIAGNOSIS — Z20.822: ICD-10-CM

## 2023-05-19 DIAGNOSIS — Z01.818: Primary | ICD-10-CM

## 2023-05-19 DIAGNOSIS — I10: ICD-10-CM

## 2023-05-30 ENCOUNTER — HOSPITAL ENCOUNTER (INPATIENT)
Dept: HOSPITAL 93 - SURG | Age: 68
LOS: 29 days | Discharge: HOME | DRG: 4 | End: 2023-06-28
Attending: INTERNAL MEDICINE | Admitting: INTERNAL MEDICINE
Payer: COMMERCIAL

## 2023-05-30 VITALS — WEIGHT: 240 LBS | BODY MASS INDEX: 40.97 KG/M2 | HEIGHT: 64 IN

## 2023-05-30 DIAGNOSIS — E66.01: ICD-10-CM

## 2023-05-30 DIAGNOSIS — Z93.0: ICD-10-CM

## 2023-05-30 DIAGNOSIS — C54.1: ICD-10-CM

## 2023-05-30 DIAGNOSIS — Z20.822: ICD-10-CM

## 2023-05-30 DIAGNOSIS — F43.20: ICD-10-CM

## 2023-05-30 DIAGNOSIS — I48.0: ICD-10-CM

## 2023-05-30 DIAGNOSIS — I10: ICD-10-CM

## 2023-05-30 DIAGNOSIS — G47.33: ICD-10-CM

## 2023-05-30 DIAGNOSIS — J18.9: ICD-10-CM

## 2023-05-30 DIAGNOSIS — J96.01: Primary | ICD-10-CM

## 2023-05-30 DIAGNOSIS — I46.9: ICD-10-CM

## 2023-06-05 ENCOUNTER — HOSPITAL ENCOUNTER (OUTPATIENT)
Dept: HOSPITAL 93 - LAB | Age: 68
Discharge: HOME | End: 2023-06-05
Attending: OBSTETRICS & GYNECOLOGY
Payer: COMMERCIAL

## 2023-06-05 DIAGNOSIS — D68.61: ICD-10-CM

## 2023-06-05 DIAGNOSIS — D68.59: ICD-10-CM

## 2023-06-05 DIAGNOSIS — D68.62: Primary | ICD-10-CM

## 2023-06-15 PROCEDURE — B24BZZZ ULTRASONOGRAPHY OF HEART WITH AORTA: ICD-10-PCS | Performed by: STUDENT IN AN ORGANIZED HEALTH CARE EDUCATION/TRAINING PROGRAM

## 2023-06-15 PROCEDURE — 0B110F4 BYPASS TRACHEA TO CUTANEOUS WITH TRACHEOSTOMY DEVICE, OPEN APPROACH: ICD-10-PCS | Performed by: OBSTETRICS & GYNECOLOGY

## 2023-06-16 PROCEDURE — B54PZZZ ULTRASONOGRAPHY OF BILATERAL UPPER EXTREMITY VEINS: ICD-10-PCS | Performed by: NUCLEAR MEDICINE

## 2023-06-16 PROCEDURE — 02HV33Z INSERTION OF INFUSION DEVICE INTO SUPERIOR VENA CAVA, PERCUTANEOUS APPROACH: ICD-10-PCS | Performed by: INTERNAL MEDICINE

## 2023-06-17 PROCEDURE — BW24YZZ COMPUTERIZED TOMOGRAPHY (CT SCAN) OF CHEST AND ABDOMEN USING OTHER CONTRAST: ICD-10-PCS | Performed by: INTERNAL MEDICINE

## 2023-06-25 PROCEDURE — 4A12X4Z MONITORING OF CARDIAC ELECTRICAL ACTIVITY, EXTERNAL APPROACH: ICD-10-PCS | Performed by: INTERNAL MEDICINE

## 2023-07-12 ENCOUNTER — HOSPITAL ENCOUNTER (OUTPATIENT)
Dept: HOSPITAL 93 - LAB | Age: 68
Discharge: HOME | End: 2023-07-12
Attending: STUDENT IN AN ORGANIZED HEALTH CARE EDUCATION/TRAINING PROGRAM
Payer: COMMERCIAL

## 2023-07-12 DIAGNOSIS — D68.62: Primary | ICD-10-CM

## 2023-08-16 ENCOUNTER — HOSPITAL ENCOUNTER (OUTPATIENT)
Dept: HOSPITAL 93 - TOM | Age: 68
Discharge: HOME | End: 2023-08-16
Payer: COMMERCIAL

## 2023-08-16 DIAGNOSIS — R22.1: Primary | ICD-10-CM

## 2023-10-12 ENCOUNTER — HOSPITAL ENCOUNTER (INPATIENT)
Dept: HOSPITAL 93 - O/R | Age: 68
LOS: 9 days | Discharge: HOME | DRG: 741 | End: 2023-10-21
Attending: OBSTETRICS & GYNECOLOGY | Admitting: OBSTETRICS & GYNECOLOGY
Payer: COMMERCIAL

## 2023-10-12 VITALS — HEIGHT: 64 IN | BODY MASS INDEX: 37.56 KG/M2 | WEIGHT: 220 LBS

## 2023-10-12 DIAGNOSIS — Z20.822: ICD-10-CM

## 2023-10-12 DIAGNOSIS — C54.1: Primary | ICD-10-CM

## 2023-10-12 LAB
BACTERIA UR QL AUTO: 152.4 UL (ref 0–1933)
EPI CELLS URNS QL MICRO: 9.7 UL (ref 0–38.8)
PH UR: 6 [PH] (ref 5–8)
RBC #/AREA URNS AUTO: 6 UL (ref 0–20.8)
SP GR UR STRIP: 1.02 (ref 1–1.03)
UROBILINOGEN UR STRIP-MCNC: 1 E.U./DL
WBC URNS QL MICRO: 107.2 UL (ref 0–23.2)

## 2023-10-19 LAB
ALBUMIN SERPL-MCNC: 3.5 GM/DL (ref 3.4–5)
ANION GAP SERPL CALC-SCNC: 8 MMOL/L (ref 10–20)
BASOPHILS NFR BLD AUTO: 0.3 % (ref 0–1.5)
BUN SERPL-MCNC: 12 MG/DL (ref 7–18)
BUN/CREAT SERPL: 13 (ref 7–25)
CALCIUM SERPL-MCNC: 9.1 MG/DL (ref 8.5–10.1)
CHLORIDE SERPL-SCNC: 110 MMOL/L (ref 98–107)
CO2 SERPL-SCNC: 28 MEQ/L (ref 21–32)
CREAT SERPL-MCNC: 0.94 MG/DL (ref 0.55–1.02)
EGFRCR SERPLBLD CKD-EPI 2021: 59.39 ML/MIN/{1.73_M2}
EOSINOPHIL NFR BLD AUTO: 0.9 % (ref 0–7)
ERYTHROCYTE [DISTWIDTH] IN BLOOD BY AUTOMATED COUNT: 13.2 % (ref 11.5–14.5)
GLUCOSE SERPL-MCNC: 128 MG/DL (ref 65–100)
HCT VFR BLD AUTO: 34.1 % (ref 36–45)
HGB BLD-MCNC: 11.8 G/DL (ref 12–15)
LYMPHOCYTES NFR BLD AUTO: 14.1 % (ref 12–44)
MAGNESIUM SERPL-MCNC: 1.8 MG/DL (ref 1.8–2.4)
MCH RBC QN AUTO: 30 PG (ref 27–32)
MCHC RBC AUTO-ENTMCNC: 34.7 G/DL (ref 32–36)
MCV RBC AUTO: 86.4 FL (ref 80–100)
MONOCYTES NFR BLD AUTO: 3.9 % (ref 2–10)
NEUTROPHILS NFR BLD AUTO: 80.8 % (ref 47–76)
OSMOLALITY SERPL: 283 MOSM/KG (ref 275–295)
PHOSPHATE SERPL-MCNC: 3.3 MG/DL (ref 2.5–4.9)
PLATELET # BLD AUTO: 198 K/UL (ref 150–450)
PMV BLD AUTO: 7.6 FL (ref 7.2–11.1)
POTASSIUM SERPL-SCNC: 4.59 MEQ/L (ref 3.5–5.1)
RBC # BLD AUTO: 3.95 M/UL (ref 4–6)
SODIUM SERPL-SCNC: 141 MMOL/L (ref 136–145)
WBC # BLD AUTO: 7.8 K/UL (ref 4.5–11)

## 2023-10-19 PROCEDURE — 0UT74ZZ RESECTION OF BILATERAL FALLOPIAN TUBES, PERCUTANEOUS ENDOSCOPIC APPROACH: ICD-10-PCS | Performed by: OBSTETRICS & GYNECOLOGY

## 2023-10-19 PROCEDURE — 07BD0ZZ EXCISION OF AORTIC LYMPHATIC, OPEN APPROACH: ICD-10-PCS | Performed by: OBSTETRICS & GYNECOLOGY

## 2023-10-19 PROCEDURE — 0UT24ZZ RESECTION OF BILATERAL OVARIES, PERCUTANEOUS ENDOSCOPIC APPROACH: ICD-10-PCS | Performed by: OBSTETRICS & GYNECOLOGY

## 2023-10-19 PROCEDURE — 07BC4ZZ EXCISION OF PELVIS LYMPHATIC, PERCUTANEOUS ENDOSCOPIC APPROACH: ICD-10-PCS | Performed by: OBSTETRICS & GYNECOLOGY

## 2023-10-19 PROCEDURE — 0UT94ZZ RESECTION OF UTERUS, PERCUTANEOUS ENDOSCOPIC APPROACH: ICD-10-PCS | Performed by: OBSTETRICS & GYNECOLOGY

## 2023-10-20 LAB
ALBUMIN SERPL-MCNC: 2.8 GM/DL (ref 3.4–5)
ANION GAP SERPL CALC-SCNC: 7 MMOL/L (ref 10–20)
ANION GAP SERPL CALC-SCNC: 8 MMOL/L (ref 10–20)
BASOPHILS NFR BLD AUTO: 0.3 % (ref 0–1.5)
BASOPHILS NFR BLD AUTO: 0.4 % (ref 0–1.5)
BUN SERPL-MCNC: 16 MG/DL (ref 7–18)
BUN SERPL-MCNC: 17 MG/DL (ref 7–18)
BUN/CREAT SERPL: 13 (ref 7–25)
BUN/CREAT SERPL: 16 (ref 7–25)
CALCIUM SERPL-MCNC: 7.8 MG/DL (ref 8.5–10.1)
CALCIUM SERPL-MCNC: 7.9 MG/DL (ref 8.5–10.1)
CHLORIDE SERPL-SCNC: 108 MMOL/L (ref 98–107)
CHLORIDE SERPL-SCNC: 109 MMOL/L (ref 98–107)
CO2 SERPL-SCNC: 30 MEQ/L (ref 21–32)
CO2 SERPL-SCNC: 30 MEQ/L (ref 21–32)
CREAT SERPL-MCNC: 1.03 MG/DL (ref 0.55–1.02)
CREAT SERPL-MCNC: 1.29 MG/DL (ref 0.55–1.02)
EGFRCR SERPLBLD CKD-EPI 2021: 41.22 ML/MIN/{1.73_M2}
EGFRCR SERPLBLD CKD-EPI 2021: 53.45 ML/MIN/{1.73_M2}
EOSINOPHIL NFR BLD AUTO: 0.4 % (ref 0–7)
EOSINOPHIL NFR BLD AUTO: 1 % (ref 0–7)
ERYTHROCYTE [DISTWIDTH] IN BLOOD BY AUTOMATED COUNT: 13.4 % (ref 11.5–14.5)
ERYTHROCYTE [DISTWIDTH] IN BLOOD BY AUTOMATED COUNT: 13.7 % (ref 11.5–14.5)
GLUCOSE SERPL-MCNC: 129 MG/DL (ref 65–100)
GLUCOSE SERPL-MCNC: 146 MG/DL (ref 65–100)
HCT VFR BLD AUTO: 22.3 % (ref 36–45)
HCT VFR BLD AUTO: 23.2 % (ref 36–45)
HGB BLD-MCNC: 7.4 G/DL (ref 12–15)
HGB BLD-MCNC: 8.2 G/DL (ref 12–15)
LYMPHOCYTES NFR BLD AUTO: 16.9 % (ref 12–44)
LYMPHOCYTES NFR BLD AUTO: 18 % (ref 12–44)
MAGNESIUM SERPL-MCNC: 1.8 MG/DL (ref 1.8–2.4)
MCH RBC QN AUTO: 29.3 PG (ref 27–32)
MCH RBC QN AUTO: 30.5 PG (ref 27–32)
MCHC RBC AUTO-ENTMCNC: 33.1 G/DL (ref 32–36)
MCHC RBC AUTO-ENTMCNC: 35.3 G/DL (ref 32–36)
MCV RBC AUTO: 86.8 FL (ref 80–100)
MCV RBC AUTO: 88.3 FL (ref 80–100)
MONOCYTES NFR BLD AUTO: 7.1 % (ref 2–10)
MONOCYTES NFR BLD AUTO: 8.7 % (ref 2–10)
NEUTROPHILS NFR BLD AUTO: 73 % (ref 47–76)
NEUTROPHILS NFR BLD AUTO: 74.2 % (ref 47–76)
OSMOLALITY SERPL: 284 MOSM/KG (ref 275–295)
OSMOLALITY SERPL: 287 MOSM/KG (ref 275–295)
PHOSPHATE SERPL-MCNC: 4 MG/DL (ref 2.5–4.9)
PLATELET # BLD AUTO: 196 K/UL (ref 150–450)
PLATELET # BLD AUTO: 214 K/UL (ref 150–450)
PMV BLD AUTO: 7.3 FL (ref 7.2–11.1)
PMV BLD AUTO: 7.6 FL (ref 7.2–11.1)
POTASSIUM SERPL-SCNC: 4.21 MEQ/L (ref 3.5–5.1)
POTASSIUM SERPL-SCNC: 4.64 MEQ/L (ref 3.5–5.1)
RBC # BLD AUTO: 2.52 M/UL (ref 4–6)
RBC # BLD AUTO: 2.68 M/UL (ref 4–6)
SODIUM SERPL-SCNC: 141 MMOL/L (ref 136–145)
SODIUM SERPL-SCNC: 142 MMOL/L (ref 136–145)
WBC # BLD AUTO: 7.3 K/UL (ref 4.5–11)
WBC # BLD AUTO: 7.5 K/UL (ref 4.5–11)

## 2023-10-21 LAB
ANION GAP SERPL CALC-SCNC: 5 MMOL/L (ref 10–20)
BASOPHILS NFR BLD AUTO: 0.5 % (ref 0–1.5)
BUN SERPL-MCNC: 14 MG/DL (ref 7–18)
BUN/CREAT SERPL: 15 (ref 7–25)
CALCIUM SERPL-MCNC: 8.3 MG/DL (ref 8.5–10.1)
CHLORIDE SERPL-SCNC: 107 MMOL/L (ref 98–107)
CO2 SERPL-SCNC: 31 MEQ/L (ref 21–32)
CREAT SERPL-MCNC: 0.92 MG/DL (ref 0.55–1.02)
EGFRCR SERPLBLD CKD-EPI 2021: 60.89 ML/MIN/{1.73_M2}
EOSINOPHIL NFR BLD AUTO: 1.9 % (ref 0–7)
ERYTHROCYTE [DISTWIDTH] IN BLOOD BY AUTOMATED COUNT: 13.4 % (ref 11.5–14.5)
GLUCOSE SERPL-MCNC: 102 MG/DL (ref 65–100)
HCT VFR BLD AUTO: 27.2 % (ref 36–45)
HGB BLD-MCNC: 9.4 G/DL (ref 12–15)
LYMPHOCYTES NFR BLD AUTO: 13.6 % (ref 12–44)
MCH RBC QN AUTO: 30.6 PG (ref 27–32)
MCHC RBC AUTO-ENTMCNC: 34.7 G/DL (ref 32–36)
MCV RBC AUTO: 88.6 FL (ref 80–100)
MONOCYTES NFR BLD AUTO: 8.9 % (ref 2–10)
NEUTROPHILS NFR BLD AUTO: 75.1 % (ref 47–76)
OSMOLALITY SERPL: 278 MOSM/KG (ref 275–295)
PLATELET # BLD AUTO: 161 K/UL (ref 150–450)
PMV BLD AUTO: 7.4 FL (ref 7.2–11.1)
POTASSIUM SERPL-SCNC: 3.84 MEQ/L (ref 3.5–5.1)
RBC # BLD AUTO: 3.07 M/UL (ref 4–6)
SODIUM SERPL-SCNC: 139 MMOL/L (ref 136–145)
WBC # BLD AUTO: 8.1 K/UL (ref 4.5–11)

## 2023-10-22 ENCOUNTER — HOSPITAL ENCOUNTER (EMERGENCY)
Dept: HOSPITAL 93 - ER | Age: 68
Discharge: HOME | End: 2023-10-22
Payer: COMMERCIAL

## 2023-10-22 VITALS — HEIGHT: 64 IN | BODY MASS INDEX: 37.56 KG/M2 | WEIGHT: 220 LBS

## 2023-10-22 DIAGNOSIS — L03.311: Primary | ICD-10-CM

## 2023-10-22 DIAGNOSIS — L76.32: ICD-10-CM

## 2023-10-22 LAB
ALBUMIN SERPL-MCNC: 3.2 GM/DL (ref 3.4–5)
ALP SERPL-CCNC: 100 U/L (ref 50–136)
ALT SERPL-CCNC: 24 U/L (ref 12–78)
ANION GAP SERPL CALC-SCNC: 5 MMOL/L (ref 10–20)
APTT PPP: 37.2 SECONDS (ref 22–34)
BACTERIA UR QL AUTO: 22.6 UL (ref 0–1933)
BASOPHILS NFR BLD AUTO: 0.4 % (ref 0–1.5)
BILIRUB SERPL-MCNC: 0.72 MG/DL (ref 0.3–1.2)
BUN SERPL-MCNC: 11 MG/DL (ref 7–18)
BUN/CREAT SERPL: 12 (ref 7–25)
CALCIUM SERPL-MCNC: 8.6 MG/DL (ref 8.5–10.1)
CHLORIDE SERPL-SCNC: 112 MMOL/L (ref 98–107)
CO2 SERPL-SCNC: 31 MEQ/L (ref 21–32)
CREAT SERPL-MCNC: 0.89 MG/DL (ref 0.55–1.02)
CRP SERPL-MCNC: 4.72 MG/DL (ref 0–0.29)
EGFRCR SERPLBLD CKD-EPI 2021: 63.26 ML/MIN/{1.73_M2}
EOSINOPHIL NFR BLD AUTO: 2.5 % (ref 0–7)
EPI CELLS URNS QL MICRO: 9.5 UL (ref 0–38.8)
ERYTHROCYTE [DISTWIDTH] IN BLOOD BY AUTOMATED COUNT: 13.4 % (ref 11.5–14.5)
GLOBULIN SER CALC-MCNC: 2.6 G/DL (ref 2.4–3.5)
GLUCOSE SERPL-MCNC: 126 MG/DL (ref 65–100)
HCT VFR BLD AUTO: 26.1 % (ref 36–45)
HGB BLD-MCNC: 9.1 G/DL (ref 12–15)
INR PPP: 0.99
LYMPHOCYTES NFR BLD AUTO: 15.1 % (ref 12–44)
MCH RBC QN AUTO: 30.9 PG (ref 27–32)
MCHC RBC AUTO-ENTMCNC: 35 G/DL (ref 32–36)
MCV RBC AUTO: 88.7 FL (ref 80–100)
MONOCYTES NFR BLD AUTO: 6.4 % (ref 2–10)
NEUTROPHILS NFR BLD AUTO: 75.6 % (ref 47–76)
OSMOLALITY SERPL: 288 MOSM/KG (ref 275–295)
PH UR: 5.5 [PH] (ref 5–8)
PLATELET # BLD AUTO: 178 K/UL (ref 150–450)
PMV BLD AUTO: 7.5 FL (ref 7.2–11.1)
POTASSIUM SERPL-SCNC: 4.26 MEQ/L (ref 3.5–5.1)
PROT SERPL-MCNC: 5.8 GM/DL (ref 6.4–8.2)
PROTHROMBIN TIME: 10.4 SECONDS (ref 9–11.5)
RBC # BLD AUTO: 2.94 M/UL (ref 4–6)
RBC #/AREA URNS AUTO: 7.4 UL (ref 0–20.8)
SCC AG SERPL-SCNC: 21 U/L (ref 15–37)
SODIUM SERPL-SCNC: 144 MMOL/L (ref 136–145)
SP GR UR STRIP: 1.02 (ref 1–1.03)
UROBILINOGEN UR STRIP-MCNC: 1 E.U./DL
WBC # BLD AUTO: 6.5 K/UL (ref 4.5–11)
WBC URNS QL MICRO: 71.3 UL (ref 0–23.2)

## 2024-03-13 ENCOUNTER — HOSPITAL ENCOUNTER (OUTPATIENT)
Dept: HOSPITAL 93 - SONOGRAMA | Age: 69
Discharge: HOME | End: 2024-03-13
Attending: INTERNAL MEDICINE
Payer: COMMERCIAL

## 2024-03-13 DIAGNOSIS — R10.9: Primary | ICD-10-CM
